# Patient Record
Sex: FEMALE | Race: ASIAN | NOT HISPANIC OR LATINO | Employment: FULL TIME
[De-identification: names, ages, dates, MRNs, and addresses within clinical notes are randomized per-mention and may not be internally consistent; named-entity substitution may affect disease eponyms.]

---

## 2018-11-21 ENCOUNTER — HEALTH MAINTENANCE LETTER (OUTPATIENT)
Age: 28
End: 2018-11-21

## 2018-11-29 ENCOUNTER — OFFICE VISIT (OUTPATIENT)
Dept: INTERNAL MEDICINE | Facility: CLINIC | Age: 28
End: 2018-11-29
Payer: COMMERCIAL

## 2018-11-29 ENCOUNTER — TELEPHONE (OUTPATIENT)
Dept: ENDOCRINOLOGY | Facility: CLINIC | Age: 28
End: 2018-11-29

## 2018-11-29 VITALS — OXYGEN SATURATION: 99 % | DIASTOLIC BLOOD PRESSURE: 70 MMHG | HEART RATE: 77 BPM | SYSTOLIC BLOOD PRESSURE: 106 MMHG

## 2018-11-29 DIAGNOSIS — D35.2 PITUITARY MICROADENOMA (H): ICD-10-CM

## 2018-11-29 DIAGNOSIS — D35.2 PITUITARY MICROADENOMA (H): Primary | ICD-10-CM

## 2018-11-29 DIAGNOSIS — N91.0 DELAYED MENSTRUATION: ICD-10-CM

## 2018-11-29 LAB
ALBUMIN SERPL-MCNC: 4.5 G/DL (ref 3.4–5)
ALP SERPL-CCNC: 72 U/L (ref 40–150)
ALT SERPL W P-5'-P-CCNC: 23 U/L (ref 0–50)
ANION GAP SERPL CALCULATED.3IONS-SCNC: 6 MMOL/L (ref 3–14)
AST SERPL W P-5'-P-CCNC: 14 U/L (ref 0–45)
BILIRUB SERPL-MCNC: 0.8 MG/DL (ref 0.2–1.3)
BUN SERPL-MCNC: 16 MG/DL (ref 7–30)
CALCIUM SERPL-MCNC: 9.3 MG/DL (ref 8.5–10.1)
CHLORIDE SERPL-SCNC: 104 MMOL/L (ref 94–109)
CO2 SERPL-SCNC: 27 MMOL/L (ref 20–32)
CREAT SERPL-MCNC: 0.59 MG/DL (ref 0.52–1.04)
ERYTHROCYTE [DISTWIDTH] IN BLOOD BY AUTOMATED COUNT: 12.2 % (ref 10–15)
ESTRADIOL SERPL-MCNC: 70 PG/ML
FSH SERPL-ACNC: 3.1 IU/L
GFR SERPL CREATININE-BSD FRML MDRD: >90 ML/MIN/1.7M2
GLUCOSE SERPL-MCNC: 82 MG/DL (ref 70–99)
HCG UR QL: NEGATIVE
HCT VFR BLD AUTO: 40.5 % (ref 35–47)
HGB BLD-MCNC: 13.8 G/DL (ref 11.7–15.7)
LH SERPL-ACNC: 11.1 IU/L
MCH RBC QN AUTO: 30.2 PG (ref 26.5–33)
MCHC RBC AUTO-ENTMCNC: 34.1 G/DL (ref 31.5–36.5)
MCV RBC AUTO: 89 FL (ref 78–100)
PLATELET # BLD AUTO: 184 10E9/L (ref 150–450)
POTASSIUM SERPL-SCNC: 3.6 MMOL/L (ref 3.4–5.3)
PROT SERPL-MCNC: 8.6 G/DL (ref 6.8–8.8)
RBC # BLD AUTO: 4.57 10E12/L (ref 3.8–5.2)
SODIUM SERPL-SCNC: 137 MMOL/L (ref 133–144)
T4 FREE SERPL-MCNC: 0.98 NG/DL (ref 0.76–1.46)
TSH SERPL DL<=0.005 MIU/L-ACNC: 1.93 MU/L (ref 0.4–4)
WBC # BLD AUTO: 3.5 10E9/L (ref 4–11)

## 2018-11-29 ASSESSMENT — PAIN SCALES - GENERAL: PAINLEVEL: NO PAIN (0)

## 2018-11-29 NOTE — PROGRESS NOTES
Internal Medicine Primary Care Clinic  -----------------------------------------------------------------  History of Present Illness   Makeda Perales is a 28 year old female with a history of pituitary microadenoma presenting with delayed menses.    Microadenoma of pituitary diagnosed 6 years ago in [place] (from [facility]). Formerly was at [facility] (I obtained records from [facility]). Took 4 years of bromocriptine. Went off bromocriptine for delivery of her baby and while breastfeeding, which she recently stopped.    Now she is presenting because her period is delayed. She had been having regular periods prior to this last period. Last period was Oct 11-15, and has not had a period since then. Sexually active, with one male partner, not using protection; neither trying for or avoiding pregnancy. No heat or cold intolerance. Normal appetite. Normal energy. Mood is not bad.    Electrical engineering post-doc. Has one daughter who is 17 months now.    Chest pain (position/pleuritic): no  Dyspnea: no   Abdominal pain: no  Weight change: no  Fever: no  Nausea/vomiting: no  Lightheaded/presyncope: no  Cough & product: no  Sore throat: no  Rhinorrhea: itchy nose  Headache: no  Myalgia: no  Dysuria/hematuria/discharge: no  Night sweats: no    -----------------------------------------------------------------  Assessment & Plan   Makeda Perales is a 28 year old female with a history of pituitary microadenoma presenting with delayed menses.    Delayed Menses  Could be due to pregnancy versus progression of microadenoma versus a broad differential. At this time we will first test for pregnancy as she has been sexually active. We will next help to establish her care here with endocrine while getting endocrine labs in preparation for that. Last prolactin 4/2018 was 18.8 in normal range but previously had been elevated in July 2017.  [ ] pregnancy test  [ ] prolactin  [ ] FSH  [ ] LH  [ ]  DHEA  [ ] Estradiol  [ ] Testosterone    Dio Brown MD (PGY-1)  [facility]  -----------------------------------------------------------------  Physical Exam   /70 (BP Location: Right arm, Patient Position: Sitting)  Pulse 77  LMP 10/11/2018 (Approximate)  SpO2 99%  Breastfeeding? No    General Appearance: no distress  HEENT: eyes equal and reactive to light  Respiratory: CTAB  Cardiovascular: RRR, no murmurs  GI: abdomen nontender, nondistended, BS+  Lymph/Hematologic: no LAD  Genitourinary: not examined  Skin: no rashes   Neurologic: grossly intact  Psychiatric: appropriate affect    -----------------------------------------------------------------  Additional Patient History  Review of Systems   The 10 point Review of Systems is negative other than noted in the HPI or here.    Past Medical History    No past medical history on file.    Past Surgical History   No past surgical history on file.    No surgeries.    Social History   Social History   Substance Use Topics     Smoking status: Never Smoker     Smokeless tobacco: Never Used     Alcohol use Not on file     , one child,   No smoking, no alcohol, no recreational drugs.    Family History    No family history on file.    No history of cancer.  No history of heart disease.  Parents still alive.  Younger brother is healthy.    Prior to Admission Medications     No current outpatient prescriptions on file prior to visit.  No current facility-administered medications on file prior to visit.     Took bromocriptine for 4 years. But would have stuffy nose sometimes.    Allergies    No Known Allergies    No allergies.    Data: Reviewed in Epic.  Pt was seen and examined with Dr. Brown.  I agree with his documentation as noted above.    My additional comments: None    J Carlos Kerr

## 2018-11-29 NOTE — NURSING NOTE
Chief Complaint   Patient presents with     Establish Care     patient is establishing care with dr patrizia Wilkerson at 9:02 AM on 11/29/2018.

## 2018-11-29 NOTE — PROGRESS NOTES
Internal Medicine Primary Care Clinic  -----------------------------------------------------------------  History of Present Illness   Makeda Perales is a 28 year old female with a history of     Period is delayed .    Last period was Oct 11-15, and has not had a period since then. Sexually active, with men, not using protection, one partner.    Microadenoma of pituitary diagnosed 6 years ago in [place] (from [facility]). Formerly was [facility] Took 4 years of bromocriptine.    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    Chest pain (position/pleuritic): no  Dyspnea: no   Abdominal pain: no  Weight change: no  Fever: no  Nausea/vomiting: no  Lightheaded/presyncope: no  Cough & product: no  Sore throat: no  Rhinorrhea: itchy nose  Headache: no  Myalgia: no  Dysuria/hematuria/discharge: no  Night sweats: no    NO heat or cold intolerance. Normal appetite. Normal energy. Mood is not bad.    Wt Readings from Last 3 Encounters:   No data found for Wt     Health maintenance:   -----------------------------------------------------------------  Assessment & Plan     [ ] prolactin  [ ] FSH/LH  DHEA  Estradiol  Testosterone      Dio Brown MD (PGY-1)  [facility]  -----------------------------------------------------------------  Physical Exam   /70 (BP Location: Right arm, Patient Position: Sitting)  Pulse 77  LMP 10/11/2018 (Approximate)  SpO2 99%  Breastfeeding? No    General Appearance:   HEENT: eyes equal and reactive to light  Respiratory: CTAB  Cardiovascular: RRR, no murmurs  GI: abdomen nontender, nondistended, BS+  Lymph/Hematologic: no LAD  Genitourinary: not examined  Skin: no rashes   Musculoskeletal:   Neurologic:   Psychiatric: appropriate affect    -----------------------------------------------------------------  Additional Patient History  Review of Systems   The 10 point Review of  Systems is negative other than noted in the HPI or here.    Past Medical History    No past medical history on file.    Past Surgical History   No past surgical history on file.    No surgeries.    Social History   Social History   Substance Use Topics     Smoking status: Never Smoker     Smokeless tobacco: Never Used     Alcohol use Not on file     , one child,   No smoking, no alcohol, no recreational drugs.    Family History    No family history on file.    No history of cancer.  No history of heart disease.  Parents still alive.  Younger brother is healthy.    Prior to Admission Medications     No current outpatient prescriptions on file prior to visit.  No current facility-administered medications on file prior to visit.     Took bromocriptine for 4 years. But would have stuffy nose sometimes.    Allergies    No Known Allergies    No allergies.    Data: Reviewed in Epic.

## 2018-11-29 NOTE — TELEPHONE ENCOUNTER
[facility] Call Center    Phone Message    May a detailed message be left on voicemail: yes    Reason for Call: Other: referral by Dr. Kerr for Pituitary microadenoma, please call pt to schedule     Action Taken: Message routed to:  [facility] Alvina

## 2018-11-29 NOTE — PATIENT INSTRUCTIONS
[facility] Medication Refill Request Information:  * Please contact your pharmacy regarding ANY request for medication refills.  ** [facility] Prescription Fax = [phone]  * Please allow 3 business days for routine medication refills.  * Please allow 5 business days for controlled substance medication refills.     [facility] Test Result notification information:  *You will be notified with in 7-10 days of your appointment day regarding the results of your test.  If you are on MyChart you will be notified as soon as the provider has reviewed the results and signed off on them.    [facility]: [phone]              [facility]         Internal Medicine Resident                   Continuity Clinic    Who We Are    Resident Continuity Clinic is a part of the [facility] Primary Care Clinic.  Resident physicians see patients independently and establish a relationship with them over the course of their three-year residency program.  As with the Primary Care Clinic, our Resident Continuity Clinic models a group practice.  If your doctor is not available, you will be able to see another resident physician.  At the end of a resident s training, patients will be transitioned to a new resident physician for ongoing care.     We treat patients with a wide array of medical needs from routine physicals, to acute illnesses, to diabetes and blood pressure management, to complex medical illness.  What is a Resident Physician?    Resident physicians hold medical degrees and are doctors. They are training to become specialists in Internal Medicine. They work under the supervision of board-certified faculty physicians.  Expectations for Your Care    We strive to provide accessible, quality care at all times.    In order to provide this care, it is best to see your primary care resident doctor consistently rather switching between providers.  In the event you do see another physician, you should  schedule a follow-up visit with your usual primary care doctor.    If you are transitioning your care from another clinic, it is helpful to have your records available for your doctor to review.    We do not prescribe controlled substances, such as ADD medications or narcotic pain medications, on your first visit.  We will review your health records and concerns prior to devising a treatment plan with you in order to provide the best care.      Clinic Services     Extended clinic hours; patient  to help navigate your visit;  parking; laboratory and imaging services with evening and weekend hours    Multiple medical and surgical specialties in one building    Complementary services, including Nutrition, Integrative Medicine, Pharmacy consultations, Mental and Behavioral Health, Sports Medicine and Physical Therapy    Thank You    We would like to thank you for choosing the [facility] Resident Continuity Clinic for your primary care. You are making a priceless contribution to the training of the next generation of health care practitioners.     Contact us at [phone] for appointments or questions.    Resident Clinic Hours are Tuesdays and Thursdays, 7:30am-5:00pm    Residents   Jerad Vega MD   (Male)   Deanna King MD  (Female)  Ena Bruno MD   (Female)   Tran Walker MD   (Female)   Obinna Lobo MD  (Male)   Angel Stover MD  (Male)   Chelsy Mancini MD    (Female)   Ellis Odell MD  (Male)   Monster Navarro MD  (Male)    Kathy Denney MD  (Female)   Charles Lovett MD  (Male)   Renetta Cota MD  (Female)   Chilango Harry MD    (Female)   Pablito Carroll MD  (Male)   Dio Brown MD  (Male)   Angel Parra MD (Male)   Gianluca Mejia MD  (Male)   Jennifer Camarena MD (Female)    Anali Haro MD (Female)   Deondre David MD  (Male)   Gabby Zhang MD    (Female)   Samina Romero MD  (Female)    Supervising Physicians   [place]  Yoselyn, MD Lili Woodson, MD Luis Enrique Sapp, MD J Carlos Kerr, MD Flores Chaudhry, MD Kandis De Luna, MD Dylan Yeager, MD Crys Vázquez, MD Dorinda Camilo MD

## 2018-11-29 NOTE — MR AVS SNAPSHOT
After Visit Summary   11/29/2018    Makeda Perales    MRN: [id number]           Patient Information     Date Of Birth          1990        Visit Information        Provider Department      11/29/2018 8:40 AM Dio Brown MD [facility]        Today's Diagnoses     Pituitary microadenoma (H)    -  1    Delayed menstruation          Care Instructions    Primary Care Center Medication Refill Request Information:  * Please contact your pharmacy regarding ANY request for medication refills.  ** [facility] Prescription Fax = [phone]  * Please allow 3 business days for routine medication refills.  * Please allow 5 business days for controlled substance medication refills.     Primary Care Center Test Result notification information:  *You will be notified with in 7-10 days of your appointment day regarding the results of your test.  If you are on MyChart you will be notified as soon as the provider has reviewed the results and signed off on them.    Primary Care Center: [phone]              [facility]         Internal Medicine Resident                   Continuity Clinic    Who We Are    Resident Continuity Clinic is a part of the [facility] Primary Care Clinic.  Resident physicians see patients independently and establish a relationship with them over the course of their three-year residency program.  As with the Primary Care Clinic, our Resident Continuity Clinic models a group practice.  If your doctor is not available, you will be able to see another resident physician.  At the end of a resident s training, patients will be transitioned to a new resident physician for ongoing care.     We treat patients with a wide array of medical needs from routine physicals, to acute illnesses, to diabetes and blood pressure management, to complex medical illness.  What is a Resident Physician?    Resident physicians hold medical degrees and are doctors. They are training to become  specialists in Internal Medicine. They work under the supervision of board-certified faculty physicians.  Expectations for Your Care    We strive to provide accessible, quality care at all times.    In order to provide this care, it is best to see your primary care resident doctor consistently rather switching between providers.  In the event you do see another physician, you should schedule a follow-up visit with your usual primary care doctor.    If you are transitioning your care from another clinic, it is helpful to have your records available for your doctor to review.    We do not prescribe controlled substances, such as ADD medications or narcotic pain medications, on your first visit.  We will review your health records and concerns prior to devising a treatment plan with you in order to provide the best care.      Clinic Services     Extended clinic hours; patient  to help navigate your visit;  parking; laboratory and imaging services with evening and weekend hours    Multiple medical and surgical specialties in one building    Complementary services, including Nutrition, Integrative Medicine, Pharmacy consultations, Mental and Behavioral Health, Sports Medicine and Physical Therapy    Thank You    We would like to thank you for choosing the [facility] Internal Medicine Resident Continuity Clinic for your primary care. You are making a priceless contribution to the training of the next generation of health care practitioners.     Contact us at [phone] for appointments or questions.    Resident Clinic Hours are Tuesdays and Thursdays, 7:30am-5:00pm    Residents   Jerad Vega MD   (Male)   Deanna King MD  (Female)  Ena Bruno MD   (Female)   Tran Walker MD   (Female)   Obinna Lobo MD  (Male)   Angel Stover MD  (Male)   Chelsy Mancini MD    (Female)   Ellis Odell MD  (Male)   Monster Navarro MD  (Male)    Kathy Denney MD  (Female)   Charles  MD Bony  (Male)   Renetta Cota MD  (Female)   Chilango Harry MD    (Female)   Pablito Carroll MD  (Male)   Dio Brown MD  (Male)   Angel Parra MD (Male)   Gianluca Mejia MD  (Male)   Jennifer Camarena MD (Female)    Anali Haro MD (Female)   Deondre David MD  (Male)   Gabby Zhang MD    (Female)   Samina Romero MD  (Female)    Supervising Physicians   MD Lili De Los Santos, MD Luis Enrique Sapp, MD J Carlos Kerr, MD Flores Chaudhry, MD Kandis De Luna, MD Dylan Yeager, MD Crys Vázquez, MD Dorinda Camilo MD                    Follow-ups after your visit        Additional Services     ENDOCRINOLOGY ADULT REFERRAL       Your provider has referred you to: nearest location available for her (she lives at [facility])    Please be aware that coverage of these services is subject to the terms and limitations of your health insurance plan.  Call member services at your health plan with any benefit or coverage questions.      Please bring the following to your appointment:    >>   Any x-rays, CTs or MRIs which have been performed.  Contact the facility where they were done to arrange for  prior to your scheduled appointment.    >>   List of current medications   >>   This referral request   >>   Any documents/labs given to you for this referral                  Future tests that were ordered for you today     Open Future Orders        Priority Expected Expires Ordered    TSH Routine 11/29/2018 11/29/2019 11/29/2018    Follicle stimulating hormone Routine 11/29/2018 11/29/2019 11/29/2018    Lutropin Routine 11/29/2018 11/29/2019 11/29/2018    Dehydroepiandrosterone Routine  11/30/2019 11/29/2018    Estradiol Routine 11/29/2018 11/29/2019 11/29/2018    Testosterone Free and Total Routine 11/29/2018 11/29/2019 11/29/2018    T4 free Routine 11/29/2018 11/29/2019 11/29/2018    Comprehensive metabolic panel Routine 11/29/2018 12/13/2018 11/29/2018    CBC with  platelets Routine 2018            Who to contact     Please call your clinic at [phone] to:    Ask questions about your health    Make or cancel appointments    Discuss your medicines    Learn about your test results    Speak to your doctor            Additional Information About Your Visit        MyChart Information     [employer] is an electronic gateway that provides easy, online access to your medical records. With [employer], you can request a clinic appointment, read your test results, renew a prescription or communicate with your care team.     To sign up for [employer] visit the website at [email or web address]   You will be asked to enter the access code listed below, as well as some personal information. Please follow the directions to create your username and password.     Your access code is: [email or web address]  Expires: 2019 10:51 AM     Your access code will  in 90 days. If you need help or a new code, please contact your [facility] or call [phone] for assistance.        Care EveryWhere ID     This is your Care EveryWhere ID. This could be used by other organizations to access your [place] medical records  [id number]        Your Vitals Were     Pulse Last Period Pulse Oximetry Breastfeeding?          77 10/11/2018 (Approximate) 99% No         Blood Pressure from Last 3 Encounters:   18 106/70    Weight from Last 3 Encounters:   No data found for Wt              We Performed the Following     ENDOCRINOLOGY ADULT REFERRAL     HCG Qual, Urine - [facility],  [place]  ([id number])        Primary Care Provider Office Phone # Fax #    Dio Neville Brown [phone]       [address] [place] [address]        Equal Access to Services     LEANA RANDLE : Jonna Merchant, maggi saldaña, violet snyder. So [facility]  [phone].    ATENCIÓN: Si habla stephany, tiene a cervantes disposición servicios gratuitos de asistencia lingüística. Llleatha al [phone].    We comply with applicable federal civil rights laws and [place] laws. We do not discriminate on the basis of race, color, national origin, age, disability, sex, sexual orientation, or gender identity.            Thank you!     Thank you for choosing [facility] PRIMARY CARE CLINIC  for your care. Our goal is always to provide you with excellent care. Hearing back from our patients is one way we can continue to improve our services. Please take a few minutes to complete the written survey that you may receive in the mail after your visit with us. Thank you!             Your Updated Medication List - Protect others around you: Learn how to safely use, store and throw away your medicines at www.disposemymeds.org.      Notice  As of 11/29/2018  9:49 AM    You have not been prescribed any medications.

## 2018-11-29 NOTE — TELEPHONE ENCOUNTER
To schedulers: Please schedule  for lst available endocrine. Preferred provider [facility].    Sofia Mark MD  Endocrine triage

## 2018-11-30 LAB
SHBG SERPL-SCNC: 92 NMOL/L (ref 30–135)
TESTOST FREE SERPL-MCNC: 0.3 NG/DL (ref 0.08–0.74)
TESTOST SERPL-MCNC: 37 NG/DL (ref 8–60)

## 2018-12-02 LAB — DHEA SERPL-MCNC: 3.34 NG/ML (ref 1.33–7.78)

## 2018-12-17 ENCOUNTER — OFFICE VISIT (OUTPATIENT)
Dept: ENDOCRINOLOGY | Facility: CLINIC | Age: 28
End: 2018-12-17
Payer: COMMERCIAL

## 2018-12-17 VITALS — WEIGHT: 111.7 LBS | SYSTOLIC BLOOD PRESSURE: 100 MMHG | HEART RATE: 77 BPM | DIASTOLIC BLOOD PRESSURE: 67 MMHG

## 2018-12-17 DIAGNOSIS — D35.2 PROLACTINOMA (H): Primary | ICD-10-CM

## 2018-12-17 DIAGNOSIS — D35.2 PROLACTINOMA (H): ICD-10-CM

## 2018-12-17 DIAGNOSIS — N64.3 GALACTORRHEA: ICD-10-CM

## 2018-12-17 DIAGNOSIS — N92.6 IRREGULAR MENSES: ICD-10-CM

## 2018-12-17 DIAGNOSIS — D49.7 PITUITARY TUMOR: ICD-10-CM

## 2018-12-17 LAB — PROLACTIN SERPL-MCNC: 51 UG/L (ref 3–27)

## 2018-12-17 ASSESSMENT — PAIN SCALES - GENERAL: PAINLEVEL: NO PAIN (0)

## 2018-12-17 NOTE — PROGRESS NOTES
Medical student's note followed by attending note    ----------  Medical Student note -------------    Makeda Angella is a 28 year old female with past medical history of Prolactinoma, diagnosed in 2012 in [place], managed with Bromocriptine 2.5mg BID for 4 years. MRI done is [place] showed pituitary mass <1cm, according to patient. Last MRI done in 10/2014 in [facility] ([facility]) showed no discrete pituitary lesion.    She became pregnant in end of 2016 and Bromocriptine was stopped. She delivered a female baby in June 2017 and was breastfeeding until April 2018. Bromocriptine was not resumed, as prolactin levels were normal.    Her primary concern is delayed periods, last cycle was 2 months. LMP 12/10/2018  She reports mild galactorrhea even now.    She is stressed due to work recently  Her energy level has been good.   No headaches, no visual disturbances, no dizziness.   She denies nausea, no change in weight; apetite good, sleeping well; no heat/cold intolerance      Past Medical History   Past Medical History:   Diagnosis Date     Prolactinoma (H)        Past Surgical History   History reviewed. No pertinent surgical history.    Current Medications  Nil      History reviewed. No pertinent family history.    Social History  She denies smoking, drinking alcohol or using illicit drugs.   Occupation: Post doctorate in electrical engineering at the [employer]  She shifted to [place] in 2013, did PhD at [facility] and shifted to [place] in August 2018    Social History     Socioeconomic History     Marital status:      Spouse name: None     Number of children: One     Years of education: None     Highest education level: None   Social Needs     Financial resource strain: None     Food insecurity - worry: None     Food insecurity - inability: None     Transportation needs - medical: None     Transportation needs - non-medical: None   Occupational History     None   Tobacco Use     Smoking status: Never Smoker     Smokeless  tobacco: Never Used   Substance and Sexual Activity     Alcohol use: None     Drug use: None     Sexual activity: None   Other Topics Concern     None   Social History Narrative     None       Review of Systems   Systemic:  Eye:                      No eye symptoms   Ethan-Laryngeal:     No ethan-laryngeal symptoms, no dysphagia, no hoarseness, no cough     Breast:                 Galactorrhea present  Cardiovascular:    No cardiovascular symptoms, no CP or palpitations   Pulmonary:           No pulmonary symptoms, no SOB or cough    Gastrointestinal:   No gastrointestinal symptoms, no diarrhea or constipation   Genitourinary:       No genitourinary symptoms, no increased thirst or urination   Endocrine:            No endocrine symptoms, no cold or heat intolerance   Neurological:        No neurological symptoms, no headaches, no tremor, no numbness or tingling sensation, no dizziness   Musculoskeletal:  No musculoskeletal symptoms, no muscle or joint pain   Skin:                     No skin symptoms, no dry skin, no hair falling out   Psychological:     No psychological symptoms                 Vital Signs     Previous Weights:    Wt Readings from Last 10 Encounters:   12/17/18 50.7 kg (111 lb 11.2 oz)        /67   Pulse 77   Wt 50.7 kg (111 lb 11.2 oz)     Physical Exam  General Appearance: she is well developed, well nourished and in no distress     Eyes:  extra-ocular motions are normal on confrontation test                                    pupils round and reactive to light, no lid lag, no stare    HEENT:   oropharynx clear and moist, no JVD, no bruits      no thyromegaly, no palpable nodules   Breast:                      Mild galactorrhea present  Cardiovascular:  regular rhythm, no murmurs, distal pulse palpable, no edema  Respiratory:        chest clear, no rales, no rhonchi   Gastrointestinal:  abdomen soft, non-tender, non-distended, normal bowel sounds,    no organomegaly  Musculoskeletal:  normal  tone and strength  Psychological:          affect and judgment normal  Skin:  warm, no lesions  Neurological:  reflexes normal and symmetric, no resting tremor.        Lab Results  I reviewed prior lab results documented in Epic.    Prolactin  2/19/2018 - 29.2  4/28/2018 - 18.8    ENDO PITUITARY LABS-[facility] Latest Ref Rng & Units 11/29/2018   TSH 0.40 - 4.00 mU/L 1.93   FSH IU/L 3.1   LUTROPIN IU/L 11.1   ESTRADIOL pg/mL 70   TESTOSTERONE TOTAL 8 - 60 ng/dL 37    - 144 mmol/L 137   POTASSIUM 3.4 - 5.3 mmol/L 3.6   GLUCOSE 70 - 99 mg/dL 82         Assessment and Plan    Pituitary Microadenoma  Patient has history of pituitary microadenoma since 2012, previously managed by Bromocriptine 2.5mg BID, which was stopped when she became pregnant. MRI done in 10/2014 showed no discrete pituitary lesion, probably due to tumor shrinkage after Bromocriptine. She had an uneventful pregnancy and delivered a daughter in June 2017. Bromocriptine was not resumed, as prolactin levels were normal.      Currently, she is concerned regarding delayed periods and continued galactorrhea. This could be influenced by her accompanied stress. We will recheck prolactin levels today. If prolactin is high, we will consider Cabergoline once a week dosing. MRI Pituitary not indicated at this point.    Recommendation  - Prolactin level today  - IGF-1 today                                                                                --- Endocrinology Initial Consultation Attending note----    Reason for visit/consult:  Prolactinoma (H)    Primary care provider: Dio Brown    HPI: A 27 yo female with history of pituitary microadenoma since 2012, previously managed by Bromocriptine 2.5mg BID, which was stopped when she became pregnant. MRI done in 10/2014 showed no discrete pituitary lesion, probably due to tumor shrinkage after Bromocriptine. She had an uneventful pregnancy and delivered a daughter in June 2017. Bromocriptine was not  resumed, as prolactin levels were normal,    Past Medical/Surgical History:  Past Medical History:   Diagnosis Date     Prolactinoma (H)      History reviewed. No pertinent surgical history.    Allergies:  No Known Allergies    Current Medications   No current outpatient medications on file.     No current facility-administered medications for this visit.        Family History:  History reviewed. No pertinent family history.    Social History:  Social History     Tobacco Use     Smoking status: Never Smoker     Smokeless tobacco: Never Used   Substance Use Topics     Alcohol use: Not on file       ROS:  Full review of systems taken with the help of the intake sheet. Otherwise a complete 14 point review of systems was taken and is negative unless stated in the history above.      Physical Exam:   /67   Pulse 77   Wt 50.7 kg (111 lb 11.2 oz)   General: well appearing, no acute distress, pleasant and conversant,   Mental Status/neuro: alert and oriented  Face: symmetrical, normal facial color  Eyes: anicteric, PERRL, no proptosis or lid lag  Visual field: intact  Occular motor: full  Neck: suppler, no lymphadenopahty  Thyroid: normal size and texture, no nodule palpable, no bruits  Breast: mild galactorrhea bilateral  Heart: regular rhythm, S1S2, no murmur appreciated  Lung: clear to auscultation bilaterally  Abdomen: soft, NT/ND, no hepatomegaly  Legs: no swelling or edema      Labs : I reviewed data from epic and extract and summarize the pertinent data here.        12/17/2018 09:48   Prolactin 51 (H)     Lab Results   Component Value Date     11/29/2018      Lab Results   Component Value Date    POTASSIUM 3.6 11/29/2018     Lab Results   Component Value Date    CHLORIDE 104 11/29/2018     Lab Results   Component Value Date    BECKY 9.3 11/29/2018     Lab Results   Component Value Date    CO2 27 11/29/2018     Lab Results   Component Value Date    BUN 16 11/29/2018     Lab Results   Component Value Date     CR 0.59 11/29/2018     Lab Results   Component Value Date    GLC 82 11/29/2018     Lab Results   Component Value Date    TSH 1.93 11/29/2018     Lab Results   Component Value Date    T4 0.98 11/29/2018     Lab Results   Component Value Date    LH 11.1 11/29/2018     Lab Results   Component Value Date    FSH 3.1 11/29/2018     Lab Results   Component Value Date    ESTROGEN 70 11/29/2018     Lab Results   Component Value Date    PROLACTIN 51 12/17/2018         MRI Brain: I extracted from care everywhere original images not available.   Interface, Radiology Results Conversion - 04/19/2016  5:07 PM EDT  Final Report  CLINICAL INDICATIONS:   h/o microadenoma routine surveillance  MRI 3072 - PIT W/WO C  - Oct 23 2014  8:34AM   Acc#: [id number]  RESULT: MRI of the brain  Indication: Reported history pituitary microadenoma. Surveillance imaging.  Technique: Multiplanar, multisequential imaging was obtained without and with  intravenous administration of contrast.  Comparison: No prior available studies.  Findings:  There is no abnormal differential enhancement within the pituitary gland. The  infundibulum is slightly deviated to the right of midline. The carotid  flow-voids are maintained. The cavernous sinuses opacify symmetrically. No  suprasellar soft tissue lesion is seen.  No diffusion abnormalities are identified to suggest acute or subacute  infarct. The ventricles are normal size. No mass effect or midline shift is  seen. No extra-axial fluid collections are noted. No brain parenchymal signal  abnormalities are seen. The cerebellum and brainstem are normal. There is no  pathologicmagnetic susceptibility artifact on the gradient refocused  acquisition.  No abnormal parenchymal or leptomeningeal enhancement is seen. The orbits are  grossly unremarkable. The craniovertebral junction and marrow signal are  normal. The major intracranial flow-voids of the level of the [place] are preserved. The mastoid air  cells and paranasal sinuses are  well-aerated.  IMPRESSION:  No discrete pituitary lesion visible.  No acute intracranial pathology.  Interpreting Physician:  SERAFIN JORDAN M.D.         Assessment and Plan  28 year old female with history of micro prolactinoma    # prolactinoma  Patient was diagnosed prolactinoma in [place] with microadenoma dictating MRI, it seems responded to by bromocriptine, by the time she came to [facility] MRI showed no sign of tumor possibly 2 more shrunk the bromocriptine.    - PRL, IGF1      # Persistent galactorrhea  Most recent prolactin level was normal, but her menstrual cycles also delaying, we will repeat PRL.  - PRL   - will determine DA based on the results.     Addendum  Contacted patient to start cabergoline 0.25 mg once a week. Recheck level in 3 month, Also order MRI.    Ref. Range 12/17/2018 09:48   Prolactin Latest Ref Range: 3 - 27 ug/L 51 (H)       I spent 45 minutes with this patient face to face and explained the conditions and plans (more than 50% of time was counseling/coordination of care) . The patient understood and is satisfied with today's visit. Return to clinic with me in 8 months.         Colleen Rodríguez MD  Staff Physician  Endocrinology and Metabolism  License: [id number]

## 2018-12-17 NOTE — LETTER
12/17/2018     RE: Makeda Perales  [address]  [place] [address]     Dear Colleague,    Thank you for referring your patient, Makeda Perales, to the [facility] ENDOCRINOLOGY at [facility]. Please see a copy of my visit note below.    Medical student's note followed by attending note    ----------  Medical Student note -------------    Makeda Perales is a 28 year old female with past medical history of Prolactinoma, diagnosed in 2012 in [place], managed with Bromocriptine 2.5mg BID for 4 years. MRI done is [place] showed pituitary mass <1cm, according to patient. Last MRI done in 10/2014 in [facility] showed no discrete pituitary lesion.    She became pregnant in end of 2016 and Bromocriptine was stopped. She delivered a female baby in June 2017 and was breastfeeding until April 2018. Bromocriptine was not resumed, as prolactin levels were normal.    Her primary concern is delayed periods, last cycle was 2 months. LMP 12/10/2018  She reports mild galactorrhea even now.    She is stressed due to work recently  Her energy level has been good.   No headaches, no visual disturbances, no dizziness.   She denies nausea, no change in weight; apetite good, sleeping well; no heat/cold intolerance      Past Medical History   Past Medical History:   Diagnosis Date     Prolactinoma (H)        Past Surgical History   History reviewed. No pertinent surgical history.    Current Medications  Nil      History reviewed. No pertinent family history.    Social History  She denies smoking, drinking alcohol or using illicit drugs.   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[employer]   She shifted to [place] in [place], did PhD at [employer] and shifted to [place] in August 2018    Social History     Socioeconomic History     Marital status:      Spouse name: None     Number of children: One     Years of education: None     Highest education level: None   Social Needs     Financial resource strain:  None     Food insecurity - worry: None     Food insecurity - inability: None     Transportation needs - medical: None     Transportation needs - non-medical: None   Occupational History     None   Tobacco Use     Smoking status: Never Smoker     Smokeless tobacco: Never Used   Substance and Sexual Activity     Alcohol use: None     Drug use: None     Sexual activity: None   Other Topics Concern     None   Social History Narrative     None       Review of Systems   Systemic:  Eye:                      No eye symptoms   Ethan-Laryngeal:     No ethan-laryngeal symptoms, no dysphagia, no hoarseness, no cough     Breast:                 Galactorrhea present  Cardiovascular:    No cardiovascular symptoms, no CP or palpitations   Pulmonary:           No pulmonary symptoms, no SOB or cough    Gastrointestinal:   No gastrointestinal symptoms, no diarrhea or constipation   Genitourinary:       No genitourinary symptoms, no increased thirst or urination   Endocrine:            No endocrine symptoms, no cold or heat intolerance   Neurological:        No neurological symptoms, no headaches, no tremor, no numbness or tingling sensation, no dizziness   Musculoskeletal:  No musculoskeletal symptoms, no muscle or joint pain   Skin:                     No skin symptoms, no dry skin, no hair falling out   Psychological:     No psychological symptoms                 Vital Signs     Previous Weights:    Wt Readings from Last 10 Encounters:   12/17/18 50.7 kg (111 lb 11.2 oz)        /67   Pulse 77   Wt 50.7 kg (111 lb 11.2 oz)     Physical Exam  General Appearance: she is well developed, well nourished and in no distress     Eyes:  extra-ocular motions are normal on confrontation test                                    pupils round and reactive to light, no lid lag, no stare    HEENT:   oropharynx clear and moist, no JVD, no bruits      no thyromegaly, no palpable nodules   Breast:                      Mild galactorrhea  present  Cardiovascular:  regular rhythm, no murmurs, distal pulse palpable, no edema  Respiratory:        chest clear, no rales, no rhonchi   Gastrointestinal:  abdomen soft, non-tender, non-distended, normal bowel sounds,    no organomegaly  Musculoskeletal:  normal tone and strength  Psychological:          affect and judgment normal  Skin:  warm, no lesions  Neurological:  reflexes normal and symmetric, no resting tremor.        Lab Results  I reviewed prior lab results documented in Epic.    Prolactin  2/19/2018 - 29.2  4/28/2018 - 18.8    ENDO PITUITARY LABS-[facility] Latest Ref Rng & Units 11/29/2018   TSH 0.40 - 4.00 mU/L 1.93   FSH IU/L 3.1   LUTROPIN IU/L 11.1   ESTRADIOL pg/mL 70   TESTOSTERONE TOTAL 8 - 60 ng/dL 37    - 144 mmol/L 137   POTASSIUM 3.4 - 5.3 mmol/L 3.6   GLUCOSE 70 - 99 mg/dL 82     Assessment and Plan    Pituitary Microadenoma  Patient has history of pituitary microadenoma since 2012, previously managed by Bromocriptine 2.5mg BID, which was stopped when she became pregnant. MRI done in 10/2014 showed no discrete pituitary lesion, probably due to tumor shrinkage after Bromocriptine. She had an uneventful pregnancy and delivered a daughter in June 2017. Bromocriptine was not resumed, as prolactin levels were normal.    Currently, she is concerned regarding delayed periods and continued galactorrhea. This could be influenced by her accompanied stress. We will recheck prolactin levels today. If prolactin is high, we will consider Cabergoline once a week dosing. MRI Pituitary not indicated at this point.    Recommendation  - Prolactin level today  - IGF-1 today                                                     --- Endocrinology Initial Consultation Attending note----    Reason for visit/consult:  Prolactinoma (H)    Primary care provider: Dio Brown    HPI: A 27 yo female with history of pituitary microadenoma since 2012, previously managed by Bromocriptine 2.5mg BID, which was  stopped when she became pregnant. MRI done in 10/2014 showed no discrete pituitary lesion, probably due to tumor shrinkage after Bromocriptine. She had an uneventful pregnancy and delivered a daughter in June 2017. Bromocriptine was not resumed, as prolactin levels were normal,    Past Medical/Surgical History:  Past Medical History:   Diagnosis Date     Prolactinoma (H)      History reviewed. No pertinent surgical history.    Allergies:  No Known Allergies    Current Medications   No current outpatient medications on file.     No current facility-administered medications for this visit.      Family History:  History reviewed. No pertinent family history.    Social History:  Social History     Tobacco Use     Smoking status: Never Smoker     Smokeless tobacco: Never Used   Substance Use Topics     Alcohol use: Not on file       ROS:  Full review of systems taken with the help of the intake sheet. Otherwise a complete 14 point review of systems was taken and is negative unless stated in the history above.      Physical Exam:   /67   Pulse 77   Wt 50.7 kg (111 lb 11.2 oz)   General: well appearing, no acute distress, pleasant and conversant,   Mental Status/neuro: alert and oriented  Face: symmetrical, normal facial color  Eyes: anicteric, PERRL, no proptosis or lid lag  Visual field: intact  Occular motor: full  Neck: suppler, no lymphadenopahty  Thyroid: normal size and texture, no nodule palpable, no bruits  Breast: mild galactorrhea bilateral  Heart: regular rhythm, S1S2, no murmur appreciated  Lung: clear to auscultation bilaterally  Abdomen: soft, NT/ND, no hepatomegaly  Legs: no swelling or edema      Labs : I reviewed data from epic and extract and summarize the pertinent data here.        12/17/2018 09:48   Prolactin 51 (H)     Lab Results   Component Value Date     11/29/2018      Lab Results   Component Value Date    POTASSIUM 3.6 11/29/2018     Lab Results   Component Value Date    CHLORIDE  104 11/29/2018     Lab Results   Component Value Date    BECKY 9.3 11/29/2018     Lab Results   Component Value Date    CO2 27 11/29/2018     Lab Results   Component Value Date    BUN 16 11/29/2018     Lab Results   Component Value Date    CR 0.59 11/29/2018     Lab Results   Component Value Date    GLC 82 11/29/2018     Lab Results   Component Value Date    TSH 1.93 11/29/2018     Lab Results   Component Value Date    T4 0.98 11/29/2018     Lab Results   Component Value Date    LH 11.1 11/29/2018     Lab Results   Component Value Date    FSH 3.1 11/29/2018     Lab Results   Component Value Date    ESTROGEN 70 11/29/2018     Lab Results   Component Value Date    PROLACTIN 51 12/17/2018         MRI Brain: I extracted from care everywhere original images not available.   Interface, Radiology Results Conversion - 04/19/2016  5:07 PM EDT  Final Report  CLINICAL INDICATIONS:   h/o microadenoma routine surveillance  MRI 3072 - PIT W/WO C  - Oct 23 2014  8:34AM   Acc#: [id number]  RESULT: MRI of the brain  Indication: Reported history pituitary microadenoma. Surveillance imaging.  Technique: Multiplanar, multisequential imaging was obtained without and with  intravenous administration of contrast.  Comparison: No prior available studies.  Findings:  There is no abnormal differential enhancement within the pituitary gland. The  infundibulum is slightly deviated to the right of midline. The carotid  flow-voids are maintained. The cavernous sinuses opacify symmetrically. No  suprasellar soft tissue lesion is seen.  No diffusion abnormalities are identified to suggest acute or subacute  infarct. The ventricles are normal size. No mass effect or midline shift is  seen. No extra-axial fluid collections are noted. No brain parenchymal signal  abnormalities are seen. The cerebellum and brainstem are normal. There is no  pathologicmagnetic susceptibility artifact on the gradient refocused  acquisition.  No abnormal parenchymal or  leptomeningeal enhancement is seen. The orbits are  grossly unremarkable. The craniovertebral junction and marrow signal are  normal. The major intracranial flow-voids of the level of the [place] are preserved. The mastoid air cells and paranasal sinuses are  well-aerated.  IMPRESSION:  No discrete pituitary lesion visible.  No acute intracranial pathology.  Interpreting Physician:  SERAFIN JORDAN M.D.         Assessment and Plan  28 year old female with history of micro prolactinoma    # prolactinoma  Patient was diagnosed prolactinoma in [place] with microadenoma dictating MRI, it seems responded to by bromocriptine, by the time she came to [facility] MRI showed no sign of tumor possibly 2 more shrunk the bromocriptine.    - PRL, IGF1      # Persistent galactorrhea  Most recent prolactin level was normal, but her menstrual cycles also delaying, we will repeat PRL.  - PRL   - will determine DA based on the results.     Addendum  Contacted patient to start cabergoline 0.25 mg once a week. Recheck level in 3 month, Also order MRI.    Ref. Range 12/17/2018 09:48   Prolactin Latest Ref Range: 3 - 27 ug/L 51 (H)       I spent 45 minutes with this patient face to face and explained the conditions and plans (more than 50% of time was counseling/coordination of care) . The patient understood and is satisfied with today's visit. Return to clinic with me in 8 months.         Colleen Rodríguez MD  Staff Physician  Endocrinology and Metabolism  License: [id number]

## 2018-12-18 LAB — IGF-I BLD-MCNC: 226 NG/ML (ref 96–301)

## 2018-12-19 RX ORDER — CABERGOLINE 0.5 MG/1
0.25 TABLET ORAL WEEKLY
Qty: 6 TABLET | Refills: 3 | Status: SHIPPED | OUTPATIENT
Start: 2018-12-19 | End: 2019-04-05

## 2019-01-05 ENCOUNTER — ANCILLARY PROCEDURE (OUTPATIENT)
Dept: MRI IMAGING | Facility: CLINIC | Age: 29
End: 2019-01-05
Attending: INTERNAL MEDICINE
Payer: COMMERCIAL

## 2019-01-05 DIAGNOSIS — D35.2 PROLACTINOMA (H): ICD-10-CM

## 2019-01-05 RX ORDER — GADOBUTROL 604.72 MG/ML
7.5 INJECTION INTRAVENOUS ONCE
Status: COMPLETED | OUTPATIENT
Start: 2019-01-05 | End: 2019-01-05

## 2019-01-05 RX ADMIN — GADOBUTROL 6 ML: 604.72 INJECTION INTRAVENOUS at 09:51

## 2019-01-22 ENCOUNTER — TELEPHONE (OUTPATIENT)
Dept: ENDOCRINOLOGY | Facility: CLINIC | Age: 29
End: 2019-01-22

## 2019-01-23 NOTE — TELEPHONE ENCOUNTER
"----- Message from Colleen Rodríguez MD sent at 1/22/2019  9:56 AM CST -----  Regarding: RE: pt has questions  cabergoline 0.25 mg once a week, prescribed immediate after previous visit. Tell her this is the smallest dose.     Colleen  ----- Message -----  From: Damari Jane RN  Sent: 1/18/2019   2:29 PM  To: Colleen Rodríguez MD  Subject: pt has questions                                 She questions the  \"small possible  Tumor\" Clarify what dose of cabergoline she should be on ? She doesn't know .     "

## 2019-04-05 ENCOUNTER — MYC REFILL (OUTPATIENT)
Dept: ENDOCRINOLOGY | Facility: CLINIC | Age: 29
End: 2019-04-05

## 2019-04-05 DIAGNOSIS — D35.2 PROLACTINOMA (H): ICD-10-CM

## 2019-04-05 RX ORDER — CABERGOLINE 0.5 MG/1
0.25 TABLET ORAL WEEKLY
Qty: 6 TABLET | Refills: 3 | Status: SHIPPED | OUTPATIENT
Start: 2019-04-05 | End: 2019-08-16

## 2019-05-28 DIAGNOSIS — D35.2 PROLACTINOMA (H): ICD-10-CM

## 2019-05-28 LAB — PROLACTIN SERPL-MCNC: 5 UG/L (ref 3–27)

## 2019-07-24 ENCOUNTER — DOCUMENTATION ONLY (OUTPATIENT)
Dept: CARE COORDINATION | Facility: CLINIC | Age: 29
End: 2019-07-24

## 2019-08-06 ENCOUNTER — OFFICE VISIT (OUTPATIENT)
Dept: ENDOCRINOLOGY | Facility: CLINIC | Age: 29
End: 2019-08-06
Payer: COMMERCIAL

## 2019-08-06 VITALS — HEART RATE: 83 BPM | WEIGHT: 112.5 LBS | SYSTOLIC BLOOD PRESSURE: 96 MMHG | DIASTOLIC BLOOD PRESSURE: 65 MMHG

## 2019-08-06 DIAGNOSIS — D35.2 PROLACTINOMA (H): Primary | ICD-10-CM

## 2019-08-06 DIAGNOSIS — D49.7 PITUITARY TUMOR: ICD-10-CM

## 2019-08-06 DIAGNOSIS — N92.6 IRREGULAR MENSTRUAL CYCLE: ICD-10-CM

## 2019-08-06 DIAGNOSIS — N64.3 GALACTORRHEA: ICD-10-CM

## 2019-08-06 ASSESSMENT — PAIN SCALES - GENERAL: PAINLEVEL: NO PAIN (0)

## 2019-08-06 NOTE — PROGRESS NOTES
- Endocrinology Follow up -    Reason for visit/consult: elevated PRL, galactorrhea, pituitary tumor    Primary care provider: Dio Brown        Assessment and Plan  28 year old female with history of micro prolactinoma    # prolactinoma  Patient was diagnosed prolactinoma in [place] with microadenoma dictating MRI, it seems responded to by bromocriptine, by the time she came to [facility] MRI showed no sign of tumor possibly shrunk the bromocriptine.   However repeated the prolactin level upon the first visit was 51, with possible microadenoma on the right intrasellar, therefore we started cabergoline 0.25 mg weekly patient is compliant and tolerant to medication.  Her prolactin level dropped from 51 down to 5 in May 2019.  Also patient started to have regular menstrual cycle for the past few months.    -Continue current cabergoline 0.25 mg weekly  -Next MRI in January 2019  - PRL to check in January 2019 prior to next visit      # Persistent galactorrhea  Due to elevated PRL, now PRL normalized and galactorrhea resolved.    -Continue current cabergoline 0.25 mg weekly  - PRL to check in January 2019 prior to next visit    # irregular cycles  Resolved with normalized PRL    -Continue current cabergoline 0.25 mg weekly      I spent 30 minutes with this patient face to face and explained the conditions and plans (more than 50% of time was counseling/coordination of care) . The patient understood and is satisfied with today's visit. Return to clinic with me in 6 month.         Colleen Rodríguez MD  Staff Physician  Endocrinology and Metabolism  License: [id number]      Interval History as of 8/6/2019: Patient has been doing well. Last seen in 12/2018 . Medication compliance excellent  . New event includes galactorrhea and irregular menses resolved .    HPI: A 27 yo female with history of pituitary microadenoma since 2012, previously managed  by Bromocriptine 2.5mg BID, which was stopped when she became pregnant. MRI done in 10/2014 showed no discrete pituitary lesion, probably due to tumor shrinkage after Bromocriptine. She had an uneventful pregnancy and delivered a daughter in June 2017. Bromocriptine was not resumed, as prolactin levels were normal,    She became pregnant in end of 2016 and Bromocriptine was stopped. She delivered a female baby in June 2017 and was breastfeeding until April 2018. Bromocriptine was not resumed, as prolactin levels were normal.    Her primary concern is delayed periods, last cycle was 2 months. LMP 12/10/2018  She reports mild galactorrhea even now.    She is stressed due to work recently  Her energy level has been good.   No headaches, no visual disturbances, no dizziness.   She denies nausea, no change in weight; apetite good, sleeping well; no heat/cold intolerance        Past Medical/Surgical History:  Past Medical History:   Diagnosis Date     Prolactinoma (H)      History reviewed. No pertinent surgical history.    Allergies:  No Known Allergies    Current Medications   Current Outpatient Medications   Medication     cabergoline (DOSTINEX) 0.5 MG tablet     No current facility-administered medications for this visit.        Family History:  History reviewed. No pertinent family history.    Social History:  Social History     Tobacco Use     Smoking status: Never Smoker     Smokeless tobacco: Never Used   Substance Use Topics     Alcohol use: Not on file   She denies smoking, drinking alcohol or using illicit drugs.   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 Otherwise a complete 14 point review of systems was taken and is negative unless stated in the history above.      Physical Exam:   BP 96/65   Pulse 83   Wt 51 kg (112 lb 8  oz)      General: well appearing, no acute distress, pleasant and conversant,   Mental Status/neuro: alert and oriented  Face: symmetrical, normal facial color  Eyes: anicteric, PERRL, no proptosis or lid lag  Visual field: intact  Occular motor: full  Neck: suppler, no lymphadenopahty  Thyroid: normal size and texture, no nodule palpable, no bruits  Breast: mild galactorrhea bilateral  Heart: regular rhythm, S1S2, no murmur appreciated  Lung: clear to auscultation bilaterally  Abdomen: soft, NT/ND, no hepatomegaly  Legs: no swelling or edema        Labs : I reviewed data from epic and extract and summarize the pertinent data here.   Lab Results   Component Value Date     11/29/2018      Lab Results   Component Value Date    POTASSIUM 3.6 11/29/2018     Lab Results   Component Value Date    CHLORIDE 104 11/29/2018     Lab Results   Component Value Date    BECKY 9.3 11/29/2018     Lab Results   Component Value Date    CO2 27 11/29/2018     Lab Results   Component Value Date    BUN 16 11/29/2018     Lab Results   Component Value Date    CR 0.59 11/29/2018     Lab Results   Component Value Date    GLC 82 11/29/2018     Lab Results   Component Value Date    TSH 1.93 11/29/2018     Lab Results   Component Value Date    T4 0.98 11/29/2018     No results found for: A1C    No results found for: IGF1  Lab Results   Component Value Date    LH 11.1 11/29/2018     Lab Results   Component Value Date    FSH 3.1 11/29/2018     No results found for: TESTOSTFREE  Lab Results   Component Value Date    ESTROGEN 70 11/29/2018     Lab Results   Component Value Date    PROLACTIN 5 05/28/2019     No results found for: PROG      MRI Brain: I personally reviewed the original images and agree with the below reports.   Results for orders placed in visit on 01/05/19   MRI Brain-PITUITARY  w & w/o contrast    Narrative Brain/ Pituitary MRI without and with contrast    History: Infertility, galactorrhea; 27 yo female  prolactinoma,  previously micro in [place].; Prolactinoma (H).  ICD-10: Prolactinoma (H)    Comparison:  None available     Technique: Axial diffusion and FLAIR images of the whole brain  obtained without intravenous contrast. Sagittal T1 and T2-weighted,  coronal T2-weighted, coronal T1-weighted images with focus on the  sella were obtained without intravenous contrast. Post intravenous  contrast using gadolinium coronal and sagittal T1-weighted images were  obtained focused on the sella. Dynamic postcontrast coronal  T1-weighted images were also obtained.    Contrast: 7.5mL Gadavist    Findings:    There is a hypoenhancing lesion within the right posterior pituitary  gland, which measures  6.0 x 3.0 x 3.0 mm, which demonstrates T1  signal that is hyperintense to the adenohypophysis and hypointense  relative to the neurohypophysis on precontrast images. The pituitary  stalk appears midline. The cavernous sinuses are clear. The optic  apparatus appears intact and not displaced.  The major cavernous  carotid vascular flow-voids appear patent.      There is no midline shift, mass effect or extra-axial fluid  collection. Ventricles are proportionate to the cerebral sulci. Flow  voids within the major intracranial vessels are present.  Diffusion-weighted images reveal no abnormal reduced diffusion. No  abnormal intracranial enhancement.    Nasal sinuses and mastoid air cells are relatively clear. Orbits are  grossly unremarkable.      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 Microadenoma is  possible.    I have personally reviewed the examination and initial interpretation  and I agree with the findings.    SERAFIN KILPATRICK MD

## 2019-08-06 NOTE — LETTER
8/6/2019       RE: Makeda Perales  [address]  [place] [address]     Dear Colleague,    Thank you for referring your patient, Makeda Perales, to the [facility] ENDOCRINOLOGY at [facility]. Please see a copy of my visit note below.                                                                               - Endocrinology Follow up -    Reason for visit/consult: elevated PRL, galactorrhea, pituitary tumor    Primary care provider: Dio Brown        Assessment and Plan  28 year old female with history of micro prolactinoma    # prolactinoma  Patient was diagnosed prolactinoma in [place] with microadenoma dictating MRI, it seems responded to by bromocriptine, by the time she came to [facility] MRI showed no sign of tumor possibly shrunk the bromocriptine.   However repeated the prolactin level upon the first visit was 51, with possible microadenoma on the right intrasellar, therefore we started cabergoline 0.25 mg weekly patient is compliant and tolerant to medication.  Her prolactin level dropped from 51 down to 5 in May 2019.  Also patient started to have regular menstrual cycle for the past few months.    -Continue current cabergoline 0.25 mg weekly  -Next MRI in January 2019  - PRL to check in January 2019 prior to next visit      # Persistent galactorrhea  Due to elevated PRL, now PRL normalized and galactorrhea resolved.    -Continue current cabergoline 0.25 mg weekly  - PRL to check in January 2019 prior to next visit    # irregular cycles  Resolved with normalized PRL    -Continue current cabergoline 0.25 mg weekly      I spent 30 minutes with this patient face to face and explained the conditions and plans (more than 50% of time was counseling/coordination of care) . The patient understood and is satisfied with today's visit. Return to clinic with me in 6 month.         Colleen Rodríguez MD  Staff Physician  Endocrinology and Metabolism  License: [id number]      Interval History  as of 8/6/2019: Patient has been doing well. Last seen in 12/2018 . Medication compliance excellent  . New event includes galactorrhea and irregular menses resolved .    HPI: A 29 yo female with history of pituitary microadenoma since 2012, previously managed by Bromocriptine 2.5mg BID, which was stopped when she became pregnant. MRI done in 10/2014 showed no discrete pituitary lesion, probably due to tumor shrinkage after Bromocriptine. She had an uneventful pregnancy and delivered a daughter in June 2017. Bromocriptine was not resumed, as prolactin levels were normal,    She became pregnant in end of 2016 and Bromocriptine was stopped. She delivered a female baby in June 2017 and was breastfeeding until April 2018. Bromocriptine was not resumed, as prolactin levels were normal.    Her primary concern is delayed periods, last cycle was 2 months. LMP 12/10/2018  She reports mild galactorrhea even now.    She is stressed due to work recently  Her energy level has been good.   No headaches, no visual disturbances, no dizziness.   She denies nausea, no change in weight; apetite good, sleeping well; no heat/cold intolerance        Past Medical/Surgical History:  Past Medical History:   Diagnosis Date     Prolactinoma (H)      History reviewed. No pertinent surgical history.    Allergies:  No Known Allergies    Current Medications   Current Outpatient Medications   Medication     cabergoline (DOSTINEX) 0.5 MG tablet     No current facility-administered medications for this visit.        Family History:  History reviewed. No pertinent family history.    Social History:  Social History     Tobacco Use     Smoking status: Never Smoker     Smokeless tobacco: Never Used   Substance Use Topics     Alcohol use: Not on file   She denies smoking, drinking alcohol or using illicit drugs.   Occupation: Post doctorate in electrical engineering at the [employer]  She shifted to [place] in [place], did PhD at [employer] [facility] and shifted to [place] in  August 2018    ROS:  Full review of systems taken with the help of the intake sheet. Otherwise a complete 14 point review of systems was taken and is negative unless stated in the history above.      Physical Exam:   BP 96/65   Pulse 83   Wt 51 kg (112 lb 8 oz)      General: well appearing, no acute distress, pleasant and conversant,   Mental Status/neuro: alert and oriented  Face: symmetrical, normal facial color  Eyes: anicteric, PERRL, no proptosis or lid lag  Visual field: intact  Occular motor: full  Neck: suppler, no lymphadenopahty  Thyroid: normal size and texture, no nodule palpable, no bruits  Breast: mild galactorrhea bilateral  Heart: regular rhythm, S1S2, no murmur appreciated  Lung: clear to auscultation bilaterally  Abdomen: soft, NT/ND, no hepatomegaly  Legs: no swelling or edema        Labs : I reviewed data from epic and extract and summarize the pertinent data here.   Lab Results   Component Value Date     11/29/2018      Lab Results   Component Value Date    POTASSIUM 3.6 11/29/2018     Lab Results   Component Value Date    CHLORIDE 104 11/29/2018     Lab Results   Component Value Date    BECKY 9.3 11/29/2018     Lab Results   Component Value Date    CO2 27 11/29/2018     Lab Results   Component Value Date    BUN 16 11/29/2018     Lab Results   Component Value Date    CR 0.59 11/29/2018     Lab Results   Component Value Date    GLC 82 11/29/2018     Lab Results   Component Value Date    TSH 1.93 11/29/2018     Lab Results   Component Value Date    T4 0.98 11/29/2018     No results found for: A1C    No results found for: IGF1  Lab Results   Component Value Date    LH 11.1 11/29/2018     Lab Results   Component Value Date    FSH 3.1 11/29/2018     No results found for: TESTOSTFREE  Lab Results   Component Value Date    ESTROGEN 70 11/29/2018     Lab Results   Component Value Date    PROLACTIN 5 05/28/2019     No results found for: PROG      MRI Brain: I personally reviewed the original images  and agree with the below reports.   Results for orders placed in visit on 01/05/19   MRI Brain-PITUITARY  w & w/o contrast    Narrative Brain/ Pituitary MRI without and with contrast    History: Infertility, galactorrhea; 27 yo female prolactinoma,  previously micro in [place].; Prolactinoma (H).  ICD-10: Prolactinoma (H)    Comparison:  None available     Technique: Axial diffusion and FLAIR images of the whole brain  obtained without intravenous contrast. Sagittal T1 and T2-weighted,  coronal T2-weighted, coronal T1-weighted images with focus on the  sella were obtained without intravenous contrast. Post intravenous  contrast using gadolinium coronal and sagittal T1-weighted images were  obtained focused on the sella. Dynamic postcontrast coronal  T1-weighted images were also obtained.    Contrast: 7.5mL Gadavist    Findings:    There is a hypoenhancing lesion within the right posterior pituitary  gland, which measures  6.0 x 3.0 x 3.0 mm, which demonstrates T1  signal that is hyperintense to the adenohypophysis and hypointense  relative to the neurohypophysis on precontrast images. The pituitary  stalk appears midline. The cavernous sinuses are clear. The optic  apparatus appears intact and not displaced.  The major cavernous  carotid vascular flow-voids appear patent.      There is no midline shift, mass effect or extra-axial fluid  collection. Ventricles are proportionate to the cerebral sulci. Flow  voids within the major intracranial vessels are present.  Diffusion-weighted images reveal no abnormal reduced diffusion. No  abnormal intracranial enhancement.    Nasal sinuses and mastoid air cells are relatively clear. Orbits are  grossly unremarkable.      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 Microadenoma is  possible.    I have personally reviewed the examination and initial interpretation  and I agree with the  findings.    SERAFIN KILPATRICK MD       Again, thank you for allowing me to participate in the care of your patient.      Sincerely,    Colleen Rodríguez MD

## 2019-08-16 ENCOUNTER — MYC REFILL (OUTPATIENT)
Dept: ENDOCRINOLOGY | Facility: CLINIC | Age: 29
End: 2019-08-16

## 2019-08-16 DIAGNOSIS — D35.2 PROLACTINOMA (H): ICD-10-CM

## 2019-08-19 RX ORDER — CABERGOLINE 0.5 MG/1
0.25 TABLET ORAL WEEKLY
Qty: 6 TABLET | Refills: 2 | Status: SHIPPED | OUTPATIENT
Start: 2019-08-19 | End: 2019-12-17

## 2019-08-27 ENCOUNTER — COMMUNICATION - HEALTHEAST (OUTPATIENT)
Dept: HEALTH INFORMATION MANAGEMENT | Facility: CLINIC | Age: 29
End: 2019-08-27

## 2019-12-17 ENCOUNTER — MYC REFILL (OUTPATIENT)
Dept: ENDOCRINOLOGY | Facility: CLINIC | Age: 29
End: 2019-12-17

## 2019-12-17 DIAGNOSIS — D35.2 PROLACTINOMA (H): ICD-10-CM

## 2019-12-18 RX ORDER — CABERGOLINE 0.5 MG/1
TABLET ORAL
Qty: 6 TABLET | Refills: 0 | Status: SHIPPED | OUTPATIENT
Start: 2019-12-18 | End: 2020-01-06

## 2020-01-06 ENCOUNTER — OFFICE VISIT (OUTPATIENT)
Dept: ENDOCRINOLOGY | Facility: CLINIC | Age: 30
End: 2020-01-06
Payer: COMMERCIAL

## 2020-01-06 ENCOUNTER — ANCILLARY PROCEDURE (OUTPATIENT)
Dept: MRI IMAGING | Facility: CLINIC | Age: 30
End: 2020-01-06
Attending: INTERNAL MEDICINE
Payer: COMMERCIAL

## 2020-01-06 VITALS
HEART RATE: 73 BPM | BODY MASS INDEX: 18.69 KG/M2 | SYSTOLIC BLOOD PRESSURE: 95 MMHG | HEIGHT: 65 IN | DIASTOLIC BLOOD PRESSURE: 60 MMHG | WEIGHT: 112.2 LBS

## 2020-01-06 DIAGNOSIS — D35.2 PROLACTINOMA (H): ICD-10-CM

## 2020-01-06 DIAGNOSIS — D49.7 PITUITARY TUMOR: Primary | ICD-10-CM

## 2020-01-06 DIAGNOSIS — N64.3 GALACTORRHEA: ICD-10-CM

## 2020-01-06 LAB — PROLACTIN SERPL-MCNC: 4 UG/L (ref 3–27)

## 2020-01-06 RX ORDER — GADOBUTROL 604.72 MG/ML
7.5 INJECTION INTRAVENOUS ONCE
Status: COMPLETED | OUTPATIENT
Start: 2020-01-06 | End: 2020-01-06

## 2020-01-06 RX ORDER — CABERGOLINE 0.5 MG/1
TABLET ORAL
Qty: 6 TABLET | Refills: 5 | Status: SHIPPED | OUTPATIENT
Start: 2020-01-06 | End: 2020-12-10

## 2020-01-06 RX ADMIN — GADOBUTROL 5 ML: 604.72 INJECTION INTRAVENOUS at 12:09

## 2020-01-06 ASSESSMENT — PAIN SCALES - GENERAL: PAINLEVEL: NO PAIN (0)

## 2020-01-06 ASSESSMENT — MIFFLIN-ST. JEOR: SCORE: 1231.88

## 2020-01-06 NOTE — PROGRESS NOTES
- Endocrinology Follow up -    Reason for visit/consult: elevated PRL, galactorrhea, pituitary tumor    Primary care provider: Dio Brown        Assessment and Plan  A 29 year old female with history of micro prolactinoma    # Prolactinoma  Patient was diagnosed prolactinoma in [place] with microadenoma dictating MRI, it seems responded to by bromocriptine, by the time she came to [facility] MRI showed no sign of tumor possibly shrunk the bromocriptine.   However repeated the prolactin level upon the first visit was 51, with possible microadenoma on the right intrasellar, therefore we started cabergoline 0.25 mg weekly patient is compliant and tolerant to medication.  Her prolactin level dropped from 51 down to 5 in May 2019.  Also patient started to have regular menstrual cycle after that. Also no galactorrhea.     -Continue current cabergoline 0.25 mg weekly    - PRL to check today    # Pituitary tumor  Went to MRI today but my personal review, no significant change in size.     # Persistent galactorrhea  Breast exam done today, resolved.      # irregular cycles  Resolved     -Continue current cabergoline 0.25 mg weekly      I spent 30 minutes with this patient face to face and explained the conditions and plans (more than 50% of time was counseling/coordination of care) . The patient understood and is satisfied with today's visit. Return to clinic with me in 6 month.         Colleen Rodríguez MD  Staff Physician  Endocrinology and Metabolism  License: [id number]    Interval History as of 1/6/2020 : Patient has been doing well. Last seen 1 year ago. Medication compliance : excellent, toleratign cabergoline well  . New event includes: none  .  Interval History as of 8/6/2019: Patient has been doing well. Last seen in 12/2018 . Medication compliance excellent  . New event includes galactorrhea and irregular menses resolved .    HPI: A 28  yo female with history of pituitary microadenoma since 2012, previously managed by Bromocriptine 2.5mg BID, which was stopped when she became pregnant. MRI done in 10/2014 showed no discrete pituitary lesion, probably due to tumor shrinkage after Bromocriptine. She had an uneventful pregnancy and delivered a daughter in June 2017. Bromocriptine was not resumed, as prolactin levels were normal,    She became pregnant in end of 2016 and Bromocriptine was stopped. She delivered a female baby in June 2017 and was breastfeeding until April 2018. Bromocriptine was not resumed, as prolactin levels were normal.    Her primary concern is delayed periods, last cycle was 2 months. LMP 12/10/2018  She reports mild galactorrhea even now.    She is stressed due to work recently  Her energy level has been good.   No headaches, no visual disturbances, no dizziness.   She denies nausea, no change in weight; apetite good, sleeping well; no heat/cold intolerance        Past Medical/Surgical History:  Past Medical History:   Diagnosis Date     Prolactinoma (H)      No past surgical history on file.    Allergies:  No Known Allergies    Current Medications   Current Outpatient Medications   Medication     cabergoline (DOSTINEX) 0.5 MG tablet     No current facility-administered medications for this visit.        Family History:  No family history on file.    Social History:  Social History     Tobacco Use     Smoking status: Never Smoker     Smokeless tobacco: Never Used   Substance Use Topics     Alcohol use: Not on file   She denies smoking, drinking alcohol or using illicit drugs.   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 Otherwise a complete 14 point review of systems was taken and is negative unless stated in the history above.      Physical Exam:  "  BP 95/60   Pulse 73   Ht 1.638 m (5' 4.5\")   Wt 50.9 kg (112 lb 3.2 oz)   BMI 18.96 kg/m       General: well appearing, no acute distress, pleasant and conversant,   Mental Status/neuro: alert and oriented  Face: symmetrical, normal facial color  Eyes: anicteric, PERRL, no proptosis or lid lag  Visual field: intact  Occular motor: full  Neck: suppler, no lymphadenopahty  Thyroid: normal size and texture, no nodule palpable, no bruits  Breast: mild galactorrhea bilateral  Heart: regular rhythm, S1S2, no murmur appreciated  Lung: clear to auscultation bilaterally  Abdomen: soft, NT/ND, no hepatomegaly  Legs: no swelling or edema        Labs : I reviewed data from epic and extract and summarize the pertinent data here.      Ref. Range 12/17/2018 09:48 5/28/2019 09:38   Prolactin Latest Ref Range: 3 - 27 ug/L 51 (H) 5       MRI Brain: I personally reviewed the original images and agree with the below reports.   1/6/2020.                Results for orders placed in visit on 01/05/19   MRI Brain-PITUITARY  w & w/o contrast    Narrative Brain/ Pituitary MRI without and with contrast    History: Infertility, galactorrhea; 29 yo female prolactinoma,  previously micro in [place].; Prolactinoma (H).  ICD-10: Prolactinoma (H)    Comparison:  None available     Technique: Axial diffusion and FLAIR images of the whole brain  obtained without intravenous contrast. Sagittal T1 and T2-weighted,  coronal T2-weighted, coronal T1-weighted images with focus on the  sella were obtained without intravenous contrast. Post intravenous  contrast using gadolinium coronal and sagittal T1-weighted images were  obtained focused on the sella. Dynamic postcontrast coronal  T1-weighted images were also obtained.    Contrast: 7.5mL Gadavist    Findings:    There is a hypoenhancing lesion within the right posterior pituitary  gland, which measures  6.0 x 3.0 x 3.0 mm, which demonstrates T1  signal that is hyperintense to the adenohypophysis and " hypointense  relative to the neurohypophysis on precontrast images. The pituitary  stalk appears midline. The cavernous sinuses are clear. The optic  apparatus appears intact and not displaced.  The major cavernous  carotid vascular flow-voids appear patent.      There is no midline shift, mass effect or extra-axial fluid  collection. Ventricles are proportionate to the cerebral sulci. Flow  voids within the major intracranial vessels are present.  Diffusion-weighted images reveal no abnormal reduced diffusion. No  abnormal intracranial enhancement.    Nasal sinuses and mastoid air cells are relatively clear. Orbits are  grossly unremarkable.      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 Microadenoma is  possible.    I have personally reviewed the examination and initial interpretation  and I agree with the findings.    SERAFIN KILPATRICK MD

## 2020-01-06 NOTE — LETTER
1/6/2020       RE: Makeda Perales  [address] [address]     Dear Colleague,    Thank you for referring your patient, Makeda Perales, to the [facility] ENDOCRINOLOGY at [facility]. Please see a copy of my visit note below.                                                                               - Endocrinology Follow up -    Reason for visit/consult: elevated PRL, galactorrhea, pituitary tumor    Primary care provider: Dio Brown        Assessment and Plan  A 29 year old female with history of micro prolactinoma    # Prolactinoma  Patient was diagnosed prolactinoma in [place] with microadenoma dictating MRI, it seems responded to by bromocriptine, by the time she came to [facility] MRI showed no sign of tumor possibly shrunk the bromocriptine.   However repeated the prolactin level upon the first visit was 51, with possible microadenoma on the right intrasellar, therefore we started cabergoline 0.25 mg weekly patient is compliant and tolerant to medication.  Her prolactin level dropped from 51 down to 5 in May 2019.  Also patient started to have regular menstrual cycle after that. Also no galactorrhea.     -Continue current cabergoline 0.25 mg weekly    - PRL to check today    # Pituitary tumor  Went to MRI today but my personal review, no significant change in size.     # Persistent galactorrhea  Breast exam done today, resolved.      # irregular cycles  Resolved     -Continue current cabergoline 0.25 mg weekly      I spent 30 minutes with this patient face to face and explained the conditions and plans (more than 50% of time was counseling/coordination of care) . The patient understood and is satisfied with today's visit. Return to clinic with me in 6 month.         Colleen Rodríguez MD  Staff Physician  Endocrinology and Metabolism  License: [id number]    Interval History as of 1/6/2020 : Patient has been doing well. Last seen 1 year ago. Medication compliance :  excellent, toleratign cabergoline well  . New event includes: none  .  Interval History as of 8/6/2019: Patient has been doing well. Last seen in 12/2018 . Medication compliance excellent  . New event includes galactorrhea and irregular menses resolved .    HPI: A 29 yo female with history of pituitary microadenoma since 2012, previously managed by Bromocriptine 2.5mg BID, which was stopped when she became pregnant. MRI done in 10/2014 showed no discrete pituitary lesion, probably due to tumor shrinkage after Bromocriptine. She had an uneventful pregnancy and delivered a daughter in June 2017. Bromocriptine was not resumed, as prolactin levels were normal,    She became pregnant in end of 2016 and Bromocriptine was stopped. She delivered a female baby in June 2017 and was breastfeeding until April 2018. Bromocriptine was not resumed, as prolactin levels were normal.    Her primary concern is delayed periods, last cycle was 2 months. LMP 12/10/2018  She reports mild galactorrhea even now.    She is stressed due to work recently  Her energy level has been good.   No headaches, no visual disturbances, no dizziness.   She denies nausea, no change in weight; apetite good, sleeping well; no heat/cold intolerance        Past Medical/Surgical History:  Past Medical History:   Diagnosis Date     Prolactinoma (H)      No past surgical history on file.    Allergies:  No Known Allergies    Current Medications   Current Outpatient Medications   Medication     cabergoline (DOSTINEX) 0.5 MG tablet     No current facility-administered medications for this visit.        Family History:  No family history on file.    Social History:  Social History     Tobacco Use     Smoking status: Never Smoker     Smokeless tobacco: Never Used   Substance Use Topics     Alcohol use: Not on file   She denies smoking, drinking alcohol or using illicit drugs.   Occupation: Post doctorate in electrical engineering at the [employer]  She shifted to [facility] in  "2013, did PhD at [employer] and shifted to [place] in August 2018    ROS:  Full review of systems taken with the help of the intake sheet. Otherwise a complete 14 point review of systems was taken and is negative unless stated in the history above.      Physical Exam:   BP 95/60   Pulse 73   Ht 1.638 m (5' 4.5\")   Wt 50.9 kg (112 lb 3.2 oz)   BMI 18.96 kg/m        General: well appearing, no acute distress, pleasant and conversant,   Mental Status/neuro: alert and oriented  Face: symmetrical, normal facial color  Eyes: anicteric, PERRL, no proptosis or lid lag  Visual field: intact  Occular motor: full  Neck: suppler, no lymphadenopahty  Thyroid: normal size and texture, no nodule palpable, no bruits  Breast: mild galactorrhea bilateral  Heart: regular rhythm, S1S2, no murmur appreciated  Lung: clear to auscultation bilaterally  Abdomen: soft, NT/ND, no hepatomegaly  Legs: no swelling or edema        Labs : I reviewed data from epic and extract and summarize the pertinent data here.      Ref. Range 12/17/2018 09:48 5/28/2019 09:38   Prolactin Latest Ref Range: 3 - 27 ug/L 51 (H) 5       MRI Brain: I personally reviewed the original images and agree with the below reports.   1/6/2020.                Results for orders placed in visit on 01/05/19   MRI Brain-PITUITARY  w & w/o contrast    Narrative Brain/ Pituitary MRI without and with contrast    History: Infertility, galactorrhea; 27 yo female prolactinoma,  previously micro in [place].; Prolactinoma (H).  ICD-10: Prolactinoma (H)    Comparison:  None available     Technique: Axial diffusion and FLAIR images of the whole brain  obtained without intravenous contrast. Sagittal T1 and T2-weighted,  coronal T2-weighted, coronal T1-weighted images with focus on the  sella were obtained without intravenous contrast. Post intravenous  contrast using gadolinium coronal and sagittal T1-weighted images were  obtained focused on the sella. Dynamic postcontrast " coronal  T1-weighted images were also obtained.    Contrast: 7.5mL Gadavist    Findings:    There is a hypoenhancing lesion within the right posterior pituitary  gland, which measures  6.0 x 3.0 x 3.0 mm, which demonstrates T1  signal that is hyperintense to the adenohypophysis and hypointense  relative to the neurohypophysis on precontrast images. The pituitary  stalk appears midline. The cavernous sinuses are clear. The optic  apparatus appears intact and not displaced.  The major cavernous  carotid vascular flow-voids appear patent.      There is no midline shift, mass effect or extra-axial fluid  collection. Ventricles are proportionate to the cerebral sulci. Flow  voids within the major intracranial vessels are present.  Diffusion-weighted images reveal no abnormal reduced diffusion. No  abnormal intracranial enhancement.    Nasal sinuses and mastoid air cells are relatively clear. Orbits are  grossly unremarkable.      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 Microadenoma is  possible.    I have personally reviewed the examination and initial interpretation  and I agree with the findings.    SERAFIN KILPATRICK MD       Again, thank you for allowing me to participate in the care of your patient.      Sincerely,    Colleen Rodríguez MD

## 2020-03-11 ENCOUNTER — HEALTH MAINTENANCE LETTER (OUTPATIENT)
Age: 30
End: 2020-03-11

## 2020-12-07 DIAGNOSIS — D35.2 PROLACTINOMA (H): ICD-10-CM

## 2020-12-10 RX ORDER — CABERGOLINE 0.5 MG/1
TABLET ORAL
Qty: 6 TABLET | Refills: 3 | Status: SHIPPED | OUTPATIENT
Start: 2020-12-10 | End: 2021-03-29

## 2020-12-10 NOTE — TELEPHONE ENCOUNTER
CABERGOLINE 0.5 MG TABLET      Last Written Prescription Date:  1-6-20  Last Fill Quantity: 6,   # refills: 5  Last Office Visit : 1-6-20  Future Office visit:  none    Routing refill request to provider for review/approval because:  Med not on Endo protocol

## 2021-01-03 ENCOUNTER — HEALTH MAINTENANCE LETTER (OUTPATIENT)
Age: 31
End: 2021-01-03

## 2021-03-27 DIAGNOSIS — D35.2 PROLACTINOMA (H): ICD-10-CM

## 2021-03-29 RX ORDER — CABERGOLINE 0.5 MG/1
TABLET ORAL
Qty: 6 TABLET | Refills: 3 | Status: SHIPPED | OUTPATIENT
Start: 2021-03-29 | End: 2022-01-11

## 2021-03-29 NOTE — TELEPHONE ENCOUNTER
cabergoline (DOSTINEX) 0.5 MG tablet        Last Written Prescription Date:  12/10/2020  Last Fill Quantity: 6,   # refills: 3  Last Office Visit : 01/06/2020  Future Office visit:  None     Routing refill request to provider for review/approval because: not on protocol

## 2021-04-06 ENCOUNTER — OFFICE VISIT - HEALTHEAST (OUTPATIENT)
Dept: FAMILY MEDICINE | Facility: CLINIC | Age: 31
End: 2021-04-06

## 2021-04-06 ENCOUNTER — NURSE TRIAGE (OUTPATIENT)
Dept: NURSING | Facility: CLINIC | Age: 31
End: 2021-04-06

## 2021-04-06 DIAGNOSIS — R09.81 NASAL CONGESTION: ICD-10-CM

## 2021-04-06 NOTE — TELEPHONE ENCOUNTER
"Patient calling reporting nasal congestion starting 2-3 years ago that comes and goes. \"Sometimes 1 nose is stuffed.\" Patient reporting current symptoms lasting longer then 2 weeks. Afebrile. Denies other symptoms.    Disposition to see provider today or tomorrow in clinic.  Transferred to Central Scheduling.    Randi Grayson RN  [facility]      COVID 19 Nurse Triage Plan/Patient Instructions    Please be aware that novel coronavirus (COVID-19) may be circulating in the community. If you develop symptoms such as fever, cough, or SOB or if you have concerns about the presence of another infection including coronavirus (COVID-19), please contact your health care provider or visit [email or web address].     Disposition/Instructions    Virtual Visit with provider recommended. Reference Visit Selection Guide.    Thank you for taking steps to prevent the spread of this virus.  o Limit your contact with others.  o Wear a simple mask to cover your cough.  o Wash your hands well and often.    Resources    [facility]: About COVID-19: [email or web address]    CDC: What to Do If You're Sick: www.cdc.gov/coronavirus/2019-ncov/about/steps-when-sick.html    CDC: Ending Home Isolation: www.cdc.gov/coronavirus/2019-ncov/hcp/disposition-in-home-patients.html     CDC: Caring for Someone: www.cdc.gov/coronavirus/2019-ncov/if-you-are-sick/care-for-someone.html     [facility]: Interim Guidance for Hospital Discharge to Home: www.health.[facility].[place].us/diseases/coronavirus/hcp/hospdischarge.pdf    [facility] clinical trials (COVID-19 research studies): clinicalaffairs.[facility]-clinical-trials     Below are the COVID-19 hotlines at the [facility] of Health ([facility]). Interpreters are available.   o For health questions: Call 239-536-6575 or 1-472.390.8231 (7 a.m. to 7 p.m.)  o For questions about schools and childcare: Call 780-339-9128 or 1-467.283.1939 (7 a.m. to 7 p.m.) "                       Additional Information    Negative: Sounds like a life-threatening emergency to the triager    Negative: Difficulty breathing, and not from stuffy nose (e.g., not relieved by cleaning out the nose)    Negative: SEVERE headache and has fever    Negative: Patient sounds very sick or weak to the triager    Negative: SEVERE sinus pain    Negative: Severe headache    Negative: Redness or swelling on the cheek, forehead, or around the eye    Negative: Fever > 103 F (39.4 C)    Negative: Fever > 101 F (38.3 C) and over 60 years of age    Negative: Fever > 100.0 F (37.8 C) and has diabetes mellitus or a weak immune system (e.g., HIV positive, cancer chemotherapy, organ transplant, splenectomy, chronic steroids)    Negative: Fever > 100.0 F (37.8 C) and bedridden (e.g., nursing home patient, stroke, chronic illness, recovering from surgery)    Negative: Fever present > 3 days (72 hours)    Negative: Fever returns after gone for over 24 hours and symptoms worse or not improved    Negative: Sinus pain (not just congestion) and fever    Negative: Earache    Sinus congestion (pressure, fullness) present > 10 days    Protocols used: SINUS PAIN AND CONGESTION-A-OH

## 2021-04-25 ENCOUNTER — HEALTH MAINTENANCE LETTER (OUTPATIENT)
Age: 31
End: 2021-04-25

## 2021-04-29 ENCOUNTER — COMMUNICATION - HEALTHEAST (OUTPATIENT)
Dept: FAMILY MEDICINE | Facility: CLINIC | Age: 31
End: 2021-04-29

## 2021-04-29 DIAGNOSIS — R09.81 NASAL CONGESTION: ICD-10-CM

## 2021-06-16 NOTE — PROGRESS NOTES
Makeda Perales is a 30 y.o. female who is being evaluated via a billable video visit.      How would you like to obtain your AVS? MyChart.    Will anyone else be joining your video visit? No      Video Start Time: 4:36 PM    Assessment & Plan     Makeda was seen today for nasal congestion and itchy and dry inside nose.    Diagnoses and all orders for this visit:    Nasal congestion  Start flonase and zyrtec  Call in 2 wks with update  May need to see ENT if not better  -     fluticasone propionate (FLONASE) 50 mcg/actuation nasal spray; Instill 1 spray in each nostril daily  -     cetirizine (ZYRTEC) 10 MG tablet; Take 1 tablet (10 mg total) by mouth daily.      Rowdy Melendez MD  [facility]   Makeda Perales is 30 y.o. and presents today for the following health issues   HPI     Nasal congestion on/off year round for 3-4 years  Itchy & dry nose on/off year round for 3-4 years  Has red & watery eyes during the pollen season  Sneezing a lot  No cough, wheezing, shortness of breath  No known seasonal allergies but thinks she may be allergic to dust & pollen  Has tried saline nasal wash  Mother and younger brother has rhinitis    PMH: hyperprolactinemia  Meds: cabergoline  PSH: none  Personal:  Not a smoker      Objective       Vitals:  No vitals were obtained today due to virtual visit.    Physical Exam  Constitutional: Patient is oriented to person, place, and time. Patient appears well-developed and well-nourished. No distress.   Head: Normocephalic and atraumatic.   Right Ear: External ear normal.   Left Ear: External ear normal.   Eyes: Conjunctivae and EOM are normal. Right eye exhibits no discharge. Left eye exhibits no discharge. No scleral icterus.   Neurological: Patient is alert and oriented to person, place, and time.  Coordination normal.   Skin: No rash noted. Patient is not diaphoretic. No erythema. No pallor.          Video-Visit Details    Type of service:   Video Visit    Video End Time (time video stopped): 4:56 PM  Originating Location (pt. Location): Home    Distant Location (provider location):  [facility]     Platform used for Video Visit: Deondre

## 2021-06-17 NOTE — TELEPHONE ENCOUNTER
Patient had VV with you on 4/6/21. Please advise on refills.    Peggy RICO CMA ([facility])

## 2021-06-17 NOTE — TELEPHONE ENCOUNTER
Former patient of ??? & has not established care with another provider.  Please assign refill request to covering provider per Clinic standard process.      RN cannot approve Refill Request    RN can NOT refill this medication no pcp. Last office visit: Visit date not found Last Physical: Visit date not found Last MTM visit: Visit date not found Last visit same specialty: Visit date not found.  Next visit within 3 mo: Visit date not found  Next physical within 3 mo: Visit date not found      Viktor Monge, [facility] Triage/Med Refill 4/30/2021    Requested Prescriptions   Pending Prescriptions Disp Refills     cetirizine (ZYRTEC) 10 MG tablet [Pharmacy Med Name: CETIRIZINE HCL 10 MG TABLET] 30 tablet 0     Sig: TAKE 1 TABLET BY MOUTH EVERY DAY       Antihistamine Refill Protocol Passed - 4/29/2021  1:34 PM        Passed - Patient has had office visit/physical in last year     Last office visit with prescriber/PCP: Visit date not found OR same dept: Visit date not found OR same specialty: Visit date not found  Last physical: Visit date not found Last MTM visit: Visit date not found   Next visit within 3 mo: Visit date not found  Next physical within 3 mo: Visit date not found  Prescriber OR PCP: Rowdy Melendez MD  Last diagnosis associated with med order: 1. Nasal congestion  - cetirizine (ZYRTEC) 10 MG tablet [Pharmacy Med Name: CETIRIZINE HCL 10 MG TABLET]; TAKE 1 TABLET BY MOUTH EVERY DAY  Dispense: 30 tablet; Refill: 0  - fluticasone propionate (FLONASE) 50 mcg/actuation nasal spray [Pharmacy Med Name: FLUTICASONE PROP 50 MCG SPRAY]; Instill 1 spray in each nostril daily  Dispense: 16 g; Refill: 1    If protocol passes may refill for 12 months if within 3 months of last provider visit (or a total of 15 months).                fluticasone propionate (FLONASE) 50 mcg/actuation nasal spray [Pharmacy Med Name: FLUTICASONE PROP 50 MCG SPRAY] 16 g 1     Sig: INSTILL 1 SPRAY IN EACH NOSTRIL DAILY        Nasal Steroid Refill Protocol Passed - 4/29/2021  1:34 PM        Passed - Patient has had office visit/physical in last 2 years     Last office visit with prescriber/PCP: Visit date not found OR same dept: Visit date not found OR same specialty: Visit date not found Last physical: Visit date not found Last MTM visit: Visit date not found    Next appt within 3 mo: Visit date not found  Next physical within 3 mo: Visit date not found  Prescriber OR PCP: Rowdy Melendez MD  Last diagnosis associated with med order: 1. Nasal congestion  - cetirizine (ZYRTEC) 10 MG tablet [Pharmacy Med Name: CETIRIZINE HCL 10 MG TABLET]; TAKE 1 TABLET BY MOUTH EVERY DAY  Dispense: 30 tablet; Refill: 0  - fluticasone propionate (FLONASE) 50 mcg/actuation nasal spray [Pharmacy Med Name: FLUTICASONE PROP 50 MCG SPRAY]; Instill 1 spray in each nostril daily  Dispense: 16 g; Refill: 1     If protocol passes may refill for 12 months if within 3 months of last provider visit (or a total of 15 months).

## 2021-06-19 NOTE — LETTER
Letter by Kojo Rich at      Author: Kojo Rich Service: -- Author Type: --    Filed:  Encounter Date: 2019 Status: (Other)         2019       Makeda Perales  [address] [place] [id number]    Dear Makeda Perales:    We are pleased to provide you with secure, online access to medical information for you and your family. Per your request, we have expanded your account to allow access to the records of the following family members:              Viry Erickson (privilege ends on 2029.)     How Do I Login?  1. In your Internet browser, go to [email or web address].  2. Click on Sign Up Now   3. Enter your [employer] Activation Code exactly as it appears below. This code will  60 days after it is generated. You will not need to use this code after you have completed the sign-up process. If you do not sign up before the expiration date, you must request a new code.     [employer] Activation Code: [email or web address]  Expires: 10/26/2019  3:37 PM    4. Enter in your date of birth and zip code.  5. Create a [employer] username. Think of one that is secure and easy to remember.  Your username must be between 6 and 20 characters.  6. Create a [employer] password. You can change your password at any time. Your password must be between 8 and 45 characters, contain at least two letters and one number, and contain both upper and lower case letters.  7. Choose a security question, enter your answer, and click Next. This can be used to access [employer] if you forget your password.   8. Enter a valid e-mail address to receive e-mail notifications when new information is available in [employer].  9. Click Sign In.        How Do I Access a Family Member's Account?  10. Select the account you want to access by clicking the [place] [employer] [place] with the appropriate patient's name at the top of your screen.   11. You will see a disclaimer page letting you know that you will be viewing a family member's record. Review the disclaimer  and then click Accept Proxy Access Disclaimer to proceed.  12. Once you switch to viewing a family member's record, you can navigate to [employer] pages the same way you would for yourself. You can return to your own account by clicking the [place] at the top of the screen with your name on it.    13. To customize colors and names of the linked accounts, you can select Personalize from the Profile dropdown menu at the top of the screen, then click the Edit button to make changes.      Additional Information  If you have questions, you can e-mail [email or web address] or call [phone] to talk to our [facility] [employer] staff. Remember, [employer] is NOT to be used for urgent needs. For medical emergencies, dial 911.

## 2021-10-10 ENCOUNTER — HEALTH MAINTENANCE LETTER (OUTPATIENT)
Age: 31
End: 2021-10-10

## 2022-01-11 DIAGNOSIS — D35.2 PROLACTINOMA (H): ICD-10-CM

## 2022-01-11 NOTE — TELEPHONE ENCOUNTER
[facility] Call Center    Phone Message    May a detailed message be left on voicemail: yes     Reason for Call: Medication Question or concern regarding medication   Prescription Clarification  Name of Medication: cabergoline (DOSTINEX) 0.5 MG tablet  Prescribing Provider: Marcos   [facility] [id number] [place] [address] [facility]   What on the order needs clarification?     Pharmacy called and said the prescription did not come in clearly through there system. Is asking that prescription be resent or to call #[phone] to verify the prescription.         Action Taken: Other: Endo    Travel Screening: Not Applicable

## 2022-01-11 NOTE — TELEPHONE ENCOUNTER
cabergoline (DOSTINEX) 0.5 MG tablet    Please send a new order.    The first order that was sent through.  Did not come through clearly and they can not read the order.   Med is not on refill protocol.   Thank you for sending a new order for Pt care.    Summer Canada RN  Central Triage Red Flags/Med Refills

## 2022-01-12 RX ORDER — CABERGOLINE 0.5 MG/1
TABLET ORAL
Qty: 6 TABLET | Refills: 3 | Status: ON HOLD | OUTPATIENT
Start: 2022-01-12 | End: 2022-09-12

## 2022-03-09 ENCOUNTER — TELEPHONE (OUTPATIENT)
Dept: ENDOCRINOLOGY | Facility: CLINIC | Age: 32
End: 2022-03-09
Payer: COMMERCIAL

## 2022-03-09 DIAGNOSIS — D35.2 PROLACTINOMA (H): Primary | ICD-10-CM

## 2022-03-09 NOTE — TELEPHONE ENCOUNTER
[facility] Call Center    Phone Message    May a detailed message be left on voicemail: yes     Reason for Call: Order(s): Other:     Reason for requested: Per Patient is wanting Dr. Rodríguez to put an order in for Prolactin. Patient is wanting to get a call back when this has been done to be able to schedule    Date needed: asap    Provider name: Marcos      Action Taken: Message routed to:  [facility] Endo    Travel Screening: Not Applicable

## 2022-03-14 NOTE — TELEPHONE ENCOUNTER
My chart message sent that prolactin is ordered for September follow up . Damari Jane RN on 3/14/2022 at 10:31 AM

## 2022-03-15 ENCOUNTER — OFFICE VISIT (OUTPATIENT)
Dept: FAMILY MEDICINE | Facility: CLINIC | Age: 32
End: 2022-03-15
Payer: COMMERCIAL

## 2022-03-15 VITALS
OXYGEN SATURATION: 98 % | HEART RATE: 99 BPM | WEIGHT: 122 LBS | RESPIRATION RATE: 14 BRPM | DIASTOLIC BLOOD PRESSURE: 77 MMHG | TEMPERATURE: 98.5 F | BODY MASS INDEX: 20.62 KG/M2 | SYSTOLIC BLOOD PRESSURE: 116 MMHG

## 2022-03-15 DIAGNOSIS — Z32.01 PREGNANCY TEST POSITIVE: Primary | ICD-10-CM

## 2022-03-15 DIAGNOSIS — Z13.9 SCREENING FOR CONDITION: ICD-10-CM

## 2022-03-15 LAB — HCG UR QL: POSITIVE

## 2022-03-15 PROCEDURE — 81025 URINE PREGNANCY TEST: CPT | Performed by: PHYSICIAN ASSISTANT

## 2022-03-15 PROCEDURE — 99213 OFFICE O/P EST LOW 20 MIN: CPT | Performed by: PHYSICIAN ASSISTANT

## 2022-03-15 RX ORDER — SWAB
1 SWAB, NON-MEDICATED MISCELLANEOUS DAILY
Qty: 120 TABLET | Refills: 0 | Status: SHIPPED | OUTPATIENT
Start: 2022-03-15 | End: 2022-07-13

## 2022-03-15 NOTE — PROGRESS NOTES
Patient presents with:  Pregnancy Test: wants to comfirm with blood test       Clinical Decision Making:  According to the patient's first day of her last menstrual period on January 5, 2022 patient is 9 & 6/7 weeks pregnant with confirmed urinary pregnancy test in the office..  Patient is instructed to take prenatal vitamin.  Referral for intake to OB/GYN for new pregnancy is written.       ICD-10-CM    1. Screening for condition  Z13.9 HCG qualitative urine     HCG qualitative urine     CANCELED: HCG qualitative urine POCT, Enter/Edit Result       Patient Instructions             Patient Education     Pregnancy    Your exam today shows that you are pregnant.  Pregnancy symptoms  During pregnancy your body s hormones change. This causes physical and emotional changes. This is normal. Knowing what to expect is important for your piece of mind and so you know when to seek help for a problem. Here are some of the most common symptoms:     Morning sickness or nausea. This can happen any time of the day or night.    Tender, swollen breasts    Need to urinate frequently    Tiredness or fatigue    Dizziness    Indigestion or heartburn    Food cravings or turn-offs    Constipation    Emotional changes. This can range from anxiety to excitement to depression.  General care for a healthy pregnancy  Here are things you can do to help make sure your baby is born healthy:    Rest when you feel tired. This is especially true in the later months of pregnancy.    Drink more fluids. Your body needs more fluids than you may be used to. Drink 8 to10 glasses of juice, milk, or water every day.    Eat well-balanced meals. Eat at regular times to give your body enough protein. You can expect to gain about 30 pounds during the pregnancy. Don t try to diet or lose weight while you are pregnant.    Take a prenatal vitamin every day. This helps you meet the extra nutritional needs of pregnancy.    Don t take any other medicine during your  pregnancy unless your healthcare provider tells you to. This includes prescription medicines and those you buy over the counter. Many medicines can harm the growing baby.    If you have nausea or vomiting, don t eat greasy or fried foods. Eat several smaller meals throughout the day rather than 3 large meals.    If you smoke, you must stop. The nicotine you breathe in goes right to the baby.    Stay away from alcohol, even in moderate amounts. Daily drinking will harm your baby and can cause permanent brain damage.    Don t use recreational drugs, especially cocaine, crack, and heroin. These will harm your baby. Also avoid marijuana.    If you were using recreational drugs or prescribed medicine when you found out that you were pregnant, talk with your healthcare provider about possible effects on your growing baby.    If you have medical problems that you need to take medicine for, talk with your healthcare provider.  Follow-up care  Call your healthcare provider to arrange for prenatal care. Prenatal care is important. You can see your family provider, a pregnancy specialist (obstetrician), a midwife, or a primary care clinic.   When to seek medical advice  Call your healthcare provider right away if any of these occur:    Vaginal bleeding    Pain in your belly (abdomen) or back that is moderate or severe    Lots of vomiting, or you can t keep any fluids down for 6 hours    Burning feeling when you urinate    Headache, dizziness, or rapid weight gain    Fever    Vision changes or blurred vision  Leanna last reviewed this educational content on 11/1/2019 2000-2021 [employer]. All rights reserved. This information is not intended as a substitute for professional medical care. Always follow your healthcare professional's instructions.           Patient Education     Nutrition During Pregnancy   Having a healthy baby depends mostly on you. What you eat matters to your baby and your health. During  pregnancy, you will likely need about 300 more calories per day than before you became pregnant. Each day, try to eat the number of servings listed here for each food group. In addition, cut down on salt and caffeine. Limit the amount of sweets and high-fat foods you eat. Don t smoke or drink alcohol.     Important: See your healthcare provider as often as requested. If you have any questions, be sure to ask them.   Fruits Vegetables Grains & Cereals* Fats & Oils   2 cups  Examples of 1-cup servings:   1 medium apple  1 medium orange  1 medium banana  1 cup chopped fruit  1 cup 100% fruit juice (pasteurized)   1/2 cup dried fruit 2-1/2 to 3 cups   Examples of 1 servin cups raw, leafy greens   1 cup raw or cooked cut-up vegetables   1 cup 100% vegetable juice (pasteurized)  6 to 8 ounces  Examples of 1-ounce servings:   1 slice bread  1/2 cup cooked rice  1/2 cup cooked cereal   1/2 cup pasta  1 ounce cold cereal 6 to 8 teaspoons   Dairy** Protein--- Fluids      3 cups  Examples of 1-cup servings:   1 cup milk  1 cup yogurt  1-1/2 ounces natural cheese   2 ounces processed cheese  5 to 6-1/2 ounces  Examples of 1-ounce servings:   1 egg  1 ounce of lean meat, poultry, or fish   1/4 cup cooked beans   1 tablespoon peanut butter   1/2 ounce nuts 8 or more 8-ounce glasses   Examples:  Water  Mineral water  Clear soups, broth      *Note: Choose whole grains whenever possible.   ** Note: Try to choose low-fat options; stay away from soft cheeses and unpasteurized milk.   --- Notes: Don't eat raw or undercooked meats, eggs, seafood, fish, and shellfish. Also, some types of fish, such as shark, swordfish, and michael mackerel, should not be eaten during pregnancy. Don't eat hot dogs, lunch meats, or cold cuts unless heated to steaming just before being served. Ask your healthcare provider about safe choices.   Prenatal supplements  A prenatal supplement is a pill that you take daily during pregnancy. It helps make sure  you re getting the right amount of certain nutrients that are important to your baby. Ask your healthcare provider to help you choose the best one for you. Important nutrients during pregnancy include:     Folic acid. It's best to start taking this supplement 1 month before you start trying to get pregnant. Folic acid helps prevent certain problems in your baby. During pregnancy, you need to take 400 micrograms (mcg) of folic acid every day for the first 2 to 3 months after conception. After that, 600 mcg is needed for a growing baby and placenta.    Iron, calcium, and vitamin D. You may also be advised to take these supplements during pregnancy. They help keep you and your baby healthy. Take them at different times because calcium makes it hard for the body to absorb iron. Taking iron with orange juice helps to increase its absorption.  [employer] last reviewed this educational content on 8/1/2020 2000-2021 [employer]. All rights reserved. This information is not intended as a substitute for professional medical care. Always follow your healthcare professional's instructions.               HPI:  Makeda Perales is a 31 year old female who presents today for confirmation of pregnancy.  Patient has had 2 at home urine pregnancy tests that were positive.  She is requesting confirmation with testing in the office today. First day of last menstrual period was January 5.  Sees she states her menses was of normal duration and normal flow.  She does have some irregularities with her menses.  No use of contraception.  Patient has not been using prenatal vitamins.    History obtained from chart review and the patient.    Problem List:  There are no relevant problems documented for this patient.      Past Medical History:   Diagnosis Date     Prolactinoma (H)        Social History     Tobacco Use     Smoking status: Never Smoker     Smokeless tobacco: Never Used   Substance Use Topics     Alcohol use: Not on file        Review of Systems  As above in HPI otherwise negative.    Vitals:    03/15/22 1051   BP: 116/77   Pulse: 99   Resp: 14   Temp: 98.5  F (36.9  C)   TempSrc: Oral   SpO2: 98%   Weight: 55.3 kg (122 lb)       General: Patient is resting comfortably no acute distress is afebrile  HEENT: Head is normocephalic atraumatic   eyes are PERRL EOMI sclera anicteric   Abdomen: Nontender nondistended no rebound or guarding no masses  Skin: Without rash non-diaphoretic    Physical Exam      Labs:  Results for orders placed or performed in visit on 03/15/22   HCG qualitative urine     Status: Abnormal   Result Value Ref Range    hCG Urine Qualitative Positive (A) Negative       At the end of the encounter, I discussed results, diagnosis, medications. Discussed red flags for immediate return to clinic/ER, as well as indications for follow up if no improvement. Patient understood and agreed to plan. Patient was stable for discharge.

## 2022-03-15 NOTE — PATIENT INSTRUCTIONS
Patient Education     Pregnancy    Your exam today shows that you are pregnant.  Pregnancy symptoms  During pregnancy your body s hormones change. This causes physical and emotional changes. This is normal. Knowing what to expect is important for your piece of mind and so you know when to seek help for a problem. Here are some of the most common symptoms:     Morning sickness or nausea. This can happen any time of the day or night.    Tender, swollen breasts    Need to urinate frequently    Tiredness or fatigue    Dizziness    Indigestion or heartburn    Food cravings or turn-offs    Constipation    Emotional changes. This can range from anxiety to excitement to depression.  General care for a healthy pregnancy  Here are things you can do to help make sure your baby is born healthy:    Rest when you feel tired. This is especially true in the later months of pregnancy.    Drink more fluids. Your body needs more fluids than you may be used to. Drink 8 to10 glasses of juice, milk, or water every day.    Eat well-balanced meals. Eat at regular times to give your body enough protein. You can expect to gain about 30 pounds during the pregnancy. Don t try to diet or lose weight while you are pregnant.    Take a prenatal vitamin every day. This helps you meet the extra nutritional needs of pregnancy.    Don t take any other medicine during your pregnancy unless your healthcare provider tells you to. This includes prescription medicines and those you buy over the counter. Many medicines can harm the growing baby.    If you have nausea or vomiting, don t eat greasy or fried foods. Eat several smaller meals throughout the day rather than 3 large meals.    If you smoke, you must stop. The nicotine you breathe in goes right to the baby.    Stay away from alcohol, even in moderate amounts. Daily drinking will harm your baby and can cause permanent brain damage.    Don t use recreational drugs, especially cocaine, crack,  and heroin. These will harm your baby. Also avoid marijuana.    If you were using recreational drugs or prescribed medicine when you found out that you were pregnant, talk with your healthcare provider about possible effects on your growing baby.    If you have medical problems that you need to take medicine for, talk with your healthcare provider.  Follow-up care  Call your healthcare provider to arrange for prenatal care. Prenatal care is important. You can see your family provider, a pregnancy specialist (obstetrician), a midwife, or a primary care clinic.   When to seek medical advice  Call your healthcare provider right away if any of these occur:    Vaginal bleeding    Pain in your belly (abdomen) or back that is moderate or severe    Lots of vomiting, or you can t keep any fluids down for 6 hours    Burning feeling when you urinate    Headache, dizziness, or rapid weight gain    Fever    Vision changes or blurred vision  Leanna last reviewed this educational content on 11/1/2019 2000-2021 [employer]. All rights reserved. This information is not intended as a substitute for professional medical care. Always follow your healthcare professional's instructions.           Patient Education     Nutrition During Pregnancy   Having a healthy baby depends mostly on you. What you eat matters to your baby and your health. During pregnancy, you will likely need about 300 more calories per day than before you became pregnant. Each day, try to eat the number of servings listed here for each food group. In addition, cut down on salt and caffeine. Limit the amount of sweets and high-fat foods you eat. Don t smoke or drink alcohol.     Important: See your healthcare provider as often as requested. If you have any questions, be sure to ask them.   Fruits Vegetables Grains & Cereals* Fats & Oils   2 cups  Examples of 1-cup servings:   1 medium apple  1 medium orange  1 medium banana  1 cup chopped fruit  1  cup 100% fruit juice (pasteurized)   1/2 cup dried fruit 2-1/2 to 3 cups   Examples of 1 servin cups raw, leafy greens   1 cup raw or cooked cut-up vegetables   1 cup 100% vegetable juice (pasteurized)  6 to 8 ounces  Examples of 1-ounce servings:   1 slice bread  1/2 cup cooked rice  1/2 cup cooked cereal   1/2 cup pasta  1 ounce cold cereal 6 to 8 teaspoons   Dairy** Protein--- Fluids      3 cups  Examples of 1-cup servings:   1 cup milk  1 cup yogurt  1-1/2 ounces natural cheese   2 ounces processed cheese  5 to 6-1/2 ounces  Examples of 1-ounce servings:   1 egg  1 ounce of lean meat, poultry, or fish   1/4 cup cooked beans   1 tablespoon peanut butter   1/2 ounce nuts 8 or more 8-ounce glasses   Examples:  Water  Mineral water  Clear soups, broth      *Note: Choose whole grains whenever possible.   ** Note: Try to choose low-fat options; stay away from soft cheeses and unpasteurized milk.   --- Notes: Don't eat raw or undercooked meats, eggs, seafood, fish, and shellfish. Also, some types of fish, such as shark, swordfish, and michael mackerel, should not be eaten during pregnancy. Don't eat hot dogs, lunch meats, or cold cuts unless heated to steaming just before being served. Ask your healthcare provider about safe choices.   Prenatal supplements  A prenatal supplement is a pill that you take daily during pregnancy. It helps make sure you re getting the right amount of certain nutrients that are important to your baby. Ask your healthcare provider to help you choose the best one for you. Important nutrients during pregnancy include:     Folic acid. It's best to start taking this supplement 1 month before you start trying to get pregnant. Folic acid helps prevent certain problems in your baby. During pregnancy, you need to take 400 micrograms (mcg) of folic acid every day for the first 2 to 3 months after conception. After that, 600 mcg is needed for a growing baby and placenta.    Iron, calcium, and  vitamin D. You may also be advised to take these supplements during pregnancy. They help keep you and your baby healthy. Take them at different times because calcium makes it hard for the body to absorb iron. Taking iron with orange juice helps to increase its absorption.  Leanna last reviewed this educational content on 8/1/2020 2000-2021 [employer]. All rights reserved. This information is not intended as a substitute for professional medical care. Always follow your healthcare professional's instructions.

## 2022-03-17 LAB
ABO (EXTERNAL): NORMAL
HEPATITIS B SURFACE ANTIGEN (EXTERNAL): NEGATIVE
HIV1+2 AB SERPL QL IA: NEGATIVE
RH (EXTERNAL): POSITIVE
RUBELLA ANTIBODY IGG (EXTERNAL): NORMAL

## 2022-05-21 ENCOUNTER — HEALTH MAINTENANCE LETTER (OUTPATIENT)
Age: 32
End: 2022-05-21

## 2022-07-06 ENCOUNTER — VIRTUAL VISIT (OUTPATIENT)
Dept: ENDOCRINOLOGY | Facility: CLINIC | Age: 32
End: 2022-07-06
Payer: COMMERCIAL

## 2022-07-06 DIAGNOSIS — D35.2 PROLACTINOMA (H): Primary | ICD-10-CM

## 2022-07-06 PROCEDURE — 99214 OFFICE O/P EST MOD 30 MIN: CPT | Mod: 95 | Performed by: INTERNAL MEDICINE

## 2022-07-06 NOTE — NURSING NOTE
Patient denies any changes since echeck-in regarding medication and allergies and states all information entered during echeck-in remains accurate.  Pearl Flores on 7/6/2022 at 7:17 AM

## 2022-07-06 NOTE — NURSING NOTE
Pt states she is pregnant. Pt advises she is 25 weeks and due 10/21/22. Pearl Flores on 7/6/2022 at 7:18 AM

## 2022-07-06 NOTE — LETTER
7/6/2022       RE: Makeda Perales  [address] [place] [id number]     Dear Colleague,    Thank you for referring your patient, Makeda Perales, to the [facility] [place] at [facility]. Please see a copy of my visit note below.    Makeda Perales  is being evaluated via a billable video visit.      How would you like to obtain your AVS? MyChart  For the video visit, send the invitation by: Send to e-mail at: [email or web address]  Will anyone else be joining your video visit? No      Video visit  Start 7:35 am  End: 7:48 am  [place]                                                                             - Endocrinology Follow up -    Reason for visit/consult: elevated PRL, galactorrhea, micro pituitary tumor    Primary care provider: Dio Brown        Assessment and Plan  A 31 year old female with history of micro prolactinoma    # Pregnancy  This is the second pregnancy (first one 6 years ago), 24 weeks.     # Prolactinoma- normalized since 2019  Patient was diagnosed prolactinoma in [place] with microadenoma dictating MRI, it seems responded to by bromocriptine, by the time she came to [facility] MRI showed no sign of tumor possibly shrunk the bromocriptine.   However repeated the prolactin level upon the first visit was 51, with possible microadenoma on the right intrasellar, therefore we started cabergoline 0.25 mg weekly patient is compliant and tolerant to medication.  Her prolactin level dropped from 51 down to 5 in May 2019.  Also patient started to have regular menstrual cycle after that. Also no galactorrhea.     -Continue to hold cabergoline through out the pregnancy as well as breast feeding  - PRL to check today    # micro Pituitary tumor  Had  MRI 2020 no significant change in size.     # Persistent galactorrhea  No issue now    # irregular cycles  Resolved     RTC with me after completing breast feeding     total 30  minutes spent on  the date of the encounter doing chart review, history and exam, documentation and, reviewed original brain MRI, further activities as noted above.    Colleen Rodríguez MD  Staff Physician  Endocrinology and Metabolism  [facility]  License: [id number]  Pager: [phone]    Interval History as of 7/6/2022 : Patient has been doing well. Currently second pregnancy week 24. she has not taking cabergoline  Interval History as of 1/6/2020 : Patient has been doing well. Last seen 1 year ago. Medication compliance : excellent, toleratign cabergoline well  . New event includes: none  .  Interval History as of 8/6/2019: Patient has been doing well. Last seen in 12/2018 . Medication compliance excellent  . New event includes galactorrhea and irregular menses resolved .    HPI: A 29 yo female with history of pituitary microadenoma since 2012, previously managed by Bromocriptine 2.5mg BID, which was stopped when she became pregnant. MRI done in 10/2014 showed no discrete pituitary lesion, probably due to tumor shrinkage after Bromocriptine. She had an uneventful pregnancy and delivered a daughter in June 2017. Bromocriptine was not resumed, as prolactin levels were normal,    She became pregnant in end of 2016 and Bromocriptine was stopped. She delivered a female baby in June 2017 and was breastfeeding until April 2018. Bromocriptine was not resumed, as prolactin levels were normal.    Her primary concern is delayed periods, last cycle was 2 months. LMP 12/10/2018  She reports mild galactorrhea even now.    She is stressed due to work recently  Her energy level has been good.   No headaches, no visual disturbances, no dizziness.   She denies nausea, no change in weight; apetite good, sleeping well; no heat/cold intolerance        Past Medical/Surgical History:  Past Medical History:   Diagnosis Date     Prolactinoma (H)      No past surgical history on file.    Allergies:  No Known Allergies    Current Medications    Current Outpatient Medications   Medication     cabergoline (DOSTINEX) 0.5 MG tablet     Prenatal Vit-Fe Fumarate-FA (PRENATAL MULTIVITAMIN  WITH IRON) 28-0.8 MG TABS     No current facility-administered medications for this visit.       Family History:  No family history on file.    Social History:  Social History     Tobacco Use     Smoking status: Never Smoker     Smokeless tobacco: Never Used   Substance Use Topics     Alcohol use: Not on file   She denies smoking, drinking alcohol or using illicit drugs.   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[employer]   She shifted to [place] in 2013, did PhD at [facility] [employer] [facility] and shifted to [place] in August 2018    ROS:  Full review of systems taken with the help of the intake sheet. Otherwise a complete 14 point review of systems was taken and is negative unless stated in the history above.      Physical Exam:   LMP 01/05/2022 (Approximate)      General: well appearing, no acute distress, pleasant and conversant,   Mental Status/neuro: alert and oriented  Face: symmetrical, normal facial color  Eyes: anicteric, no proptosis or lid lag  Resp: normally breathing        Labs : I reviewed data from epic and extract and summarize the pertinent data here.      Ref. Range 12/17/2018 09:48 5/28/2019 09:38   Prolactin Latest Ref Range: 3 - 27 ug/L 51 (H) 5       MRI Brain: I personally reviewed the original images and agree with the below reports.   1/6/2020.                Results for orders placed in visit on 01/05/19   MRI Brain-PITUITARY  w & w/o contrast    Narrative Brain/ Pituitary MRI without and with contrast    History: Infertility, galactorrhea; 27 yo female prolactinoma,  previously micro in [place].; Prolactinoma (H).  ICD-10: Prolactinoma (H)    Comparison:  None available     Technique: Axial diffusion and FLAIR images of the whole brain  obtained without intravenous contrast. Sagittal T1 and T2-weighted,  coronal T2-weighted, coronal T1-weighted images with  focus on the  sella were obtained without intravenous contrast. Post intravenous  contrast using gadolinium coronal and sagittal T1-weighted images were  obtained focused on the sella. Dynamic postcontrast coronal  T1-weighted images were also obtained.    Contrast: 7.5mL Gadavist    Findings:    There is a hypoenhancing lesion within the right posterior pituitary  gland, which measures  6.0 x 3.0 x 3.0 mm, which demonstrates T1  signal that is hyperintense to the adenohypophysis and hypointense  relative to the neurohypophysis on precontrast images. The pituitary  stalk appears midline. The cavernous sinuses are clear. The optic  apparatus appears intact and not displaced.  The major cavernous  carotid vascular flow-voids appear patent.      There is no midline shift, mass effect or extra-axial fluid  collection. Ventricles are proportionate to the cerebral sulci. Flow  voids within the major intracranial vessels are present.  Diffusion-weighted images reveal no abnormal reduced diffusion. No  abnormal intracranial enhancement.    Nasal sinuses and mastoid air cells are relatively clear. Orbits are  grossly unremarkable.      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 Microadenoma is  possible.    I have personally reviewed the examination and initial interpretation  and I agree with the findings.    SERAFIN KILPATRICK MD

## 2022-07-06 NOTE — PROGRESS NOTES
Makeda Perales  is being evaluated via a billable video visit.      How would you like to obtain your AVS? BlazentharCoSchedule  For the video visit, send the invitation by: Send to e-mail at: [email or web address]  Will anyone else be joining your video visit? No      Video visit  Start 7:35 am  End: 7:48 am  Amwell                                                                             - Endocrinology Follow up -    Reason for visit/consult: elevated PRL, galactorrhea, micro pituitary tumor    Primary care provider: Dio Brown        Assessment and Plan  A 31 year old female with history of micro prolactinoma    # Pregnancy  This is the second pregnancy (first one 6 years ago), 24 weeks.     # Prolactinoma- normalized since 2019  Patient was diagnosed prolactinoma in [place] with microadenoma dictating MRI, it seems responded to by bromocriptine, by the time she came to [facility] MRI showed no sign of tumor possibly shrunk the bromocriptine.   However repeated the prolactin level upon the first visit was 51, with possible microadenoma on the right intrasellar, therefore we started cabergoline 0.25 mg weekly patient is compliant and tolerant to medication.  Her prolactin level dropped from 51 down to 5 in May 2019.  Also patient started to have regular menstrual cycle after that. Also no galactorrhea.     -Continue to hold cabergoline through out the pregnancy as well as breast feeding  - PRL to check today    # micro Pituitary tumor  Had  MRI 2020 no significant change in size.     # Persistent galactorrhea  No issue now    # irregular cycles  Resolved     RTC with me after completing breast feeding     total 30  minutes spent on the date of the encounter doing chart review, history and exam, documentation and, reviewed original brain MRI, further activities as noted above.    Colleen Rodríguez MD  Staff Physician  Endocrinology and Metabolism  [facility]  License: [id number]  Pager: [phone]    Interval History  as of 7/6/2022 : Patient has been doing well. Currently second pregnancy week 24. she has not taking cabergoline  Interval History as of 1/6/2020 : Patient has been doing well. Last seen 1 year ago. Medication compliance : excellent, toleratign cabergoline well  . New event includes: none  .  Interval History as of 8/6/2019: Patient has been doing well. Last seen in 12/2018 . Medication compliance excellent  . New event includes galactorrhea and irregular menses resolved .    HPI: A 29 yo female with history of pituitary microadenoma since 2012, previously managed by Bromocriptine 2.5mg BID, which was stopped when she became pregnant. MRI done in 10/2014 showed no discrete pituitary lesion, probably due to tumor shrinkage after Bromocriptine. She had an uneventful pregnancy and delivered a daughter in June 2017. Bromocriptine was not resumed, as prolactin levels were normal,    She became pregnant in end of 2016 and Bromocriptine was stopped. She delivered a female baby in June 2017 and was breastfeeding until April 2018. Bromocriptine was not resumed, as prolactin levels were normal.    Her primary concern is delayed periods, last cycle was 2 months. LMP 12/10/2018  She reports mild galactorrhea even now.    She is stressed due to work recently  Her energy level has been good.   No headaches, no visual disturbances, no dizziness.   She denies nausea, no change in weight; apetite good, sleeping well; no heat/cold intolerance        Past Medical/Surgical History:  Past Medical History:   Diagnosis Date     Prolactinoma (H)      No past surgical history on file.    Allergies:  No Known Allergies    Current Medications   Current Outpatient Medications   Medication     cabergoline (DOSTINEX) 0.5 MG tablet     Prenatal Vit-Fe Fumarate-FA (PRENATAL MULTIVITAMIN  WITH IRON) 28-0.8 MG TABS     No current facility-administered medications for this visit.       Family History:  No family history on file.    Social  History:  Social History     Tobacco Use     Smoking status: Never Smoker     Smokeless tobacco: Never Used   Substance Use Topics     Alcohol use: Not on file   She denies smoking, drinking alcohol or using illicit drugs.   Occupation: Post doctorate in electrical engineering at the [employer]  She shifted to [place] in 2013, did PhD at [facility] and shifted to [place] in August 2018    ROS:  Full review of systems taken with the help of the intake sheet. Otherwise a complete 14 point review of systems was taken and is negative unless stated in the history above.      Physical Exam:   LMP 01/05/2022 (Approximate)      General: well appearing, no acute distress, pleasant and conversant,   Mental Status/neuro: alert and oriented  Face: symmetrical, normal facial color  Eyes: anicteric, no proptosis or lid lag  Resp: normally breathing        Labs : I reviewed data from epic and extract and summarize the pertinent data here.      Ref. Range 12/17/2018 09:48 5/28/2019 09:38   Prolactin Latest Ref Range: 3 - 27 ug/L 51 (H) 5       MRI Brain: I personally reviewed the original images and agree with the below reports.   1/6/2020.                Results for orders placed in visit on 01/05/19   MRI Brain-PITUITARY  w & w/o contrast    Narrative Brain/ Pituitary MRI without and with contrast    History: Infertility, galactorrhea; 29 yo female prolactinoma,  previously micro in [place].; Prolactinoma (H).  ICD-10: Prolactinoma (H)    Comparison:  None available     Technique: Axial diffusion and FLAIR images of the whole brain  obtained without intravenous contrast. Sagittal T1 and T2-weighted,  coronal T2-weighted, coronal T1-weighted images with focus on the  sella were obtained without intravenous contrast. Post intravenous  contrast using gadolinium coronal and sagittal T1-weighted images were  obtained focused on the sella. Dynamic postcontrast coronal  T1-weighted images were also obtained.    Contrast: 7.5mL Gadavist    Findings:     There is a hypoenhancing lesion within the right posterior pituitary  gland, which measures  6.0 x 3.0 x 3.0 mm, which demonstrates T1  signal that is hyperintense to the adenohypophysis and hypointense  relative to the neurohypophysis on precontrast images. The pituitary  stalk appears midline. The cavernous sinuses are clear. The optic  apparatus appears intact and not displaced.  The major cavernous  carotid vascular flow-voids appear patent.      There is no midline shift, mass effect or extra-axial fluid  collection. Ventricles are proportionate to the cerebral sulci. Flow  voids within the major intracranial vessels are present.  Diffusion-weighted images reveal no abnormal reduced diffusion. No  abnormal intracranial enhancement.    Nasal sinuses and mastoid air cells are relatively clear. Orbits are  grossly unremarkable.      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 Microadenoma is  possible.    I have personally reviewed the examination and initial interpretation  and I agree with the findings.    SERAFIN KILPATRICK MD

## 2022-09-12 ENCOUNTER — HOSPITAL ENCOUNTER (OUTPATIENT)
Facility: CLINIC | Age: 32
End: 2022-09-12
Admitting: OBSTETRICS & GYNECOLOGY
Payer: COMMERCIAL

## 2022-09-12 ENCOUNTER — HOSPITAL ENCOUNTER (OUTPATIENT)
Facility: CLINIC | Age: 32
Discharge: HOME | End: 2022-09-12
Attending: OBSTETRICS & GYNECOLOGY | Admitting: OBSTETRICS & GYNECOLOGY
Payer: COMMERCIAL

## 2022-09-12 VITALS — RESPIRATION RATE: 16 BRPM | BODY MASS INDEX: 19.64 KG/M2 | TEMPERATURE: 98.3 F | HEIGHT: 72 IN | WEIGHT: 145 LBS

## 2022-09-12 PROBLEM — Z36.89 ENCOUNTER FOR TRIAGE IN PREGNANT PATIENT: Status: ACTIVE | Noted: 2022-09-12

## 2022-09-12 LAB
CLUE CELLS: ABNORMAL
RUPTURE OF FETAL MEMBRANES BY ROM PLUS: NEGATIVE
TRICHOMONAS, WET PREP: ABNORMAL
WBC'S/HIGH POWER FIELD, WET PREP: ABNORMAL
YEAST, WET PREP: ABNORMAL

## 2022-09-12 PROCEDURE — 87210 SMEAR WET MOUNT SALINE/INK: CPT | Performed by: OBSTETRICS & GYNECOLOGY

## 2022-09-12 PROCEDURE — G0463 HOSPITAL OUTPT CLINIC VISIT: HCPCS

## 2022-09-12 PROCEDURE — 84112 EVAL AMNIOTIC FLUID PROTEIN: CPT | Performed by: OBSTETRICS & GYNECOLOGY

## 2022-09-12 RX ORDER — PRENATAL VIT/IRON FUM/FOLIC AC 27MG-0.8MG
1 TABLET ORAL DAILY
COMMUNITY

## 2022-09-12 RX ORDER — LIDOCAINE 40 MG/G
CREAM TOPICAL
Status: DISCONTINUED | OUTPATIENT
Start: 2022-09-12 | End: 2022-09-12 | Stop reason: HOSPADM

## 2022-09-12 NOTE — PROGRESS NOTES
Pt reported to triage c/o possible srom. Pt states she is not wearing a pad, but her underwear has been more wet than usual. ROM+ done and wet prep done. Both negative. FHR category I. No contractions. Dr. Mendoza updated. Pt relieved and will follow up in clinic.    Jodi Angel RN

## 2022-09-18 ENCOUNTER — HEALTH MAINTENANCE LETTER (OUTPATIENT)
Age: 32
End: 2022-09-18

## 2022-10-04 ENCOUNTER — HOSPITAL ENCOUNTER (INPATIENT)
Facility: CLINIC | Age: 32
LOS: 2 days | Discharge: HOME HEALTH | End: 2022-10-06
Attending: OBSTETRICS & GYNECOLOGY | Admitting: OBSTETRICS & GYNECOLOGY
Payer: COMMERCIAL

## 2022-10-04 PROBLEM — Z34.90 PREGNANT: Status: ACTIVE | Noted: 2022-10-04

## 2022-10-04 LAB
ABO/RH(D): NORMAL
ANTIBODY SCREEN: NEGATIVE
GROUP B STREPTOCOCCUS (EXTERNAL): NEGATIVE
SPECIMEN EXPIRATION DATE: NORMAL

## 2022-10-04 PROCEDURE — 120N000001 HC R&B MED SURG/OB

## 2022-10-04 RX ORDER — OXYTOCIN 10 [USP'U]/ML
10 INJECTION, SOLUTION INTRAMUSCULAR; INTRAVENOUS
Status: DISCONTINUED | OUTPATIENT
Start: 2022-10-04 | End: 2022-10-06 | Stop reason: HOSPADM

## 2022-10-04 RX ORDER — LIDOCAINE 40 MG/G
CREAM TOPICAL
Status: DISCONTINUED | OUTPATIENT
Start: 2022-10-04 | End: 2022-10-04 | Stop reason: HOSPADM

## 2022-10-04 RX ORDER — OXYTOCIN/0.9 % SODIUM CHLORIDE 30/500 ML
100-340 PLASTIC BAG, INJECTION (ML) INTRAVENOUS CONTINUOUS PRN
Status: DISCONTINUED | OUTPATIENT
Start: 2022-10-04 | End: 2022-10-06 | Stop reason: HOSPADM

## 2022-10-04 RX ORDER — KETOROLAC TROMETHAMINE 30 MG/ML
30 INJECTION, SOLUTION INTRAMUSCULAR; INTRAVENOUS
Status: DISCONTINUED | OUTPATIENT
Start: 2022-10-04 | End: 2022-10-06 | Stop reason: HOSPADM

## 2022-10-04 RX ORDER — CARBOPROST TROMETHAMINE 250 UG/ML
250 INJECTION, SOLUTION INTRAMUSCULAR
Status: DISCONTINUED | OUTPATIENT
Start: 2022-10-04 | End: 2022-10-05 | Stop reason: HOSPADM

## 2022-10-04 RX ORDER — LIDOCAINE 40 MG/G
CREAM TOPICAL
Status: DISCONTINUED | OUTPATIENT
Start: 2022-10-04 | End: 2022-10-05 | Stop reason: HOSPADM

## 2022-10-04 RX ORDER — NALOXONE HYDROCHLORIDE 0.4 MG/ML
0.2 INJECTION, SOLUTION INTRAMUSCULAR; INTRAVENOUS; SUBCUTANEOUS
Status: DISCONTINUED | OUTPATIENT
Start: 2022-10-04 | End: 2022-10-05 | Stop reason: HOSPADM

## 2022-10-04 RX ORDER — IBUPROFEN 800 MG/1
800 TABLET, FILM COATED ORAL
Status: DISCONTINUED | OUTPATIENT
Start: 2022-10-04 | End: 2022-10-06 | Stop reason: HOSPADM

## 2022-10-04 RX ORDER — NALOXONE HYDROCHLORIDE 0.4 MG/ML
0.4 INJECTION, SOLUTION INTRAMUSCULAR; INTRAVENOUS; SUBCUTANEOUS
Status: DISCONTINUED | OUTPATIENT
Start: 2022-10-04 | End: 2022-10-05 | Stop reason: HOSPADM

## 2022-10-04 RX ORDER — ONDANSETRON 2 MG/ML
4 INJECTION INTRAMUSCULAR; INTRAVENOUS EVERY 6 HOURS PRN
Status: DISCONTINUED | OUTPATIENT
Start: 2022-10-04 | End: 2022-10-05 | Stop reason: HOSPADM

## 2022-10-04 RX ORDER — PROCHLORPERAZINE 25 MG
25 SUPPOSITORY, RECTAL RECTAL EVERY 12 HOURS PRN
Status: DISCONTINUED | OUTPATIENT
Start: 2022-10-04 | End: 2022-10-05 | Stop reason: HOSPADM

## 2022-10-04 RX ORDER — MISOPROSTOL 200 UG/1
400 TABLET ORAL
Status: DISCONTINUED | OUTPATIENT
Start: 2022-10-04 | End: 2022-10-05 | Stop reason: HOSPADM

## 2022-10-04 RX ORDER — PROCHLORPERAZINE MALEATE 10 MG
10 TABLET ORAL EVERY 6 HOURS PRN
Status: DISCONTINUED | OUTPATIENT
Start: 2022-10-04 | End: 2022-10-05 | Stop reason: HOSPADM

## 2022-10-04 RX ORDER — ACETAMINOPHEN 325 MG/1
650 TABLET ORAL EVERY 4 HOURS PRN
Status: DISCONTINUED | OUTPATIENT
Start: 2022-10-04 | End: 2022-10-05 | Stop reason: HOSPADM

## 2022-10-04 RX ORDER — OXYTOCIN/0.9 % SODIUM CHLORIDE 30/500 ML
340 PLASTIC BAG, INJECTION (ML) INTRAVENOUS CONTINUOUS PRN
Status: DISCONTINUED | OUTPATIENT
Start: 2022-10-04 | End: 2022-10-05 | Stop reason: HOSPADM

## 2022-10-04 RX ORDER — ONDANSETRON 4 MG/1
4 TABLET, ORALLY DISINTEGRATING ORAL EVERY 6 HOURS PRN
Status: DISCONTINUED | OUTPATIENT
Start: 2022-10-04 | End: 2022-10-05 | Stop reason: HOSPADM

## 2022-10-04 RX ORDER — CITRIC ACID/SODIUM CITRATE 334-500MG
30 SOLUTION, ORAL ORAL
Status: DISCONTINUED | OUTPATIENT
Start: 2022-10-04 | End: 2022-10-05 | Stop reason: HOSPADM

## 2022-10-04 RX ORDER — MISOPROSTOL 200 UG/1
800 TABLET ORAL
Status: DISCONTINUED | OUTPATIENT
Start: 2022-10-04 | End: 2022-10-05 | Stop reason: HOSPADM

## 2022-10-04 RX ORDER — METHYLERGONOVINE MALEATE 0.2 MG/ML
200 INJECTION INTRAVENOUS
Status: DISCONTINUED | OUTPATIENT
Start: 2022-10-04 | End: 2022-10-05 | Stop reason: HOSPADM

## 2022-10-04 RX ORDER — METOCLOPRAMIDE HYDROCHLORIDE 5 MG/ML
10 INJECTION INTRAMUSCULAR; INTRAVENOUS EVERY 6 HOURS PRN
Status: DISCONTINUED | OUTPATIENT
Start: 2022-10-04 | End: 2022-10-05 | Stop reason: HOSPADM

## 2022-10-04 RX ORDER — OXYTOCIN 10 [USP'U]/ML
10 INJECTION, SOLUTION INTRAMUSCULAR; INTRAVENOUS
Status: COMPLETED | OUTPATIENT
Start: 2022-10-04 | End: 2022-10-05

## 2022-10-04 RX ORDER — METOCLOPRAMIDE 10 MG/1
10 TABLET ORAL EVERY 6 HOURS PRN
Status: DISCONTINUED | OUTPATIENT
Start: 2022-10-04 | End: 2022-10-05 | Stop reason: HOSPADM

## 2022-10-04 ASSESSMENT — ACTIVITIES OF DAILY LIVING (ADL)
TOILETING_ISSUES: NO
DRESSING/BATHING_DIFFICULTY: NO
CHANGE_IN_FUNCTIONAL_STATUS_SINCE_ONSET_OF_CURRENT_ILLNESS/INJURY: NO
CONCENTRATING,_REMEMBERING_OR_MAKING_DECISIONS_DIFFICULTY: OTHER (SEE COMMENTS)
WEAR_GLASSES_OR_BLIND: YES
VISION_MANAGEMENT: GLASSES
ADLS_ACUITY_SCORE: 21
FALL_HISTORY_WITHIN_LAST_SIX_MONTHS: NO
DIFFICULTY_EATING/SWALLOWING: NO
DOING_ERRANDS_INDEPENDENTLY_DIFFICULTY: NO
WALKING_OR_CLIMBING_STAIRS_DIFFICULTY: NO

## 2022-10-05 ENCOUNTER — ANESTHESIA (OUTPATIENT)
Dept: OBGYN | Facility: CLINIC | Age: 32
End: 2022-10-05
Payer: COMMERCIAL

## 2022-10-05 ENCOUNTER — ANESTHESIA EVENT (OUTPATIENT)
Dept: OBGYN | Facility: CLINIC | Age: 32
End: 2022-10-05
Payer: COMMERCIAL

## 2022-10-05 LAB — T PALLIDUM AB SER QL: NONREACTIVE

## 2022-10-05 PROCEDURE — 36415 COLL VENOUS BLD VENIPUNCTURE: CPT | Performed by: OBSTETRICS & GYNECOLOGY

## 2022-10-05 PROCEDURE — 250N000013 HC RX MED GY IP 250 OP 250 PS 637: Performed by: OBSTETRICS & GYNECOLOGY

## 2022-10-05 PROCEDURE — 250N000011 HC RX IP 250 OP 636: Performed by: OBSTETRICS & GYNECOLOGY

## 2022-10-05 PROCEDURE — 0KQM0ZZ REPAIR PERINEUM MUSCLE, OPEN APPROACH: ICD-10-PCS | Performed by: OBSTETRICS & GYNECOLOGY

## 2022-10-05 PROCEDURE — 722N000001 HC LABOR CARE VAGINAL DELIVERY SINGLE

## 2022-10-05 PROCEDURE — 250N000009 HC RX 250: Performed by: ANESTHESIOLOGY

## 2022-10-05 PROCEDURE — 00HU33Z INSERTION OF INFUSION DEVICE INTO SPINAL CANAL, PERCUTANEOUS APPROACH: ICD-10-PCS | Performed by: ANESTHESIOLOGY

## 2022-10-05 PROCEDURE — 86780 TREPONEMA PALLIDUM: CPT | Performed by: OBSTETRICS & GYNECOLOGY

## 2022-10-05 PROCEDURE — 3E0R3BZ INTRODUCTION OF ANESTHETIC AGENT INTO SPINAL CANAL, PERCUTANEOUS APPROACH: ICD-10-PCS | Performed by: ANESTHESIOLOGY

## 2022-10-05 PROCEDURE — 250N000011 HC RX IP 250 OP 636: Performed by: ANESTHESIOLOGY

## 2022-10-05 PROCEDURE — 120N000001 HC R&B MED SURG/OB

## 2022-10-05 PROCEDURE — 86901 BLOOD TYPING SEROLOGIC RH(D): CPT | Performed by: OBSTETRICS & GYNECOLOGY

## 2022-10-05 PROCEDURE — 370N000003 HC ANESTHESIA WARD SERVICE

## 2022-10-05 RX ORDER — OXYTOCIN 10 [USP'U]/ML
10 INJECTION, SOLUTION INTRAMUSCULAR; INTRAVENOUS
Status: DISCONTINUED | OUTPATIENT
Start: 2022-10-05 | End: 2022-10-06 | Stop reason: HOSPADM

## 2022-10-05 RX ORDER — OXYTOCIN/0.9 % SODIUM CHLORIDE 30/500 ML
340 PLASTIC BAG, INJECTION (ML) INTRAVENOUS CONTINUOUS PRN
Status: DISCONTINUED | OUTPATIENT
Start: 2022-10-05 | End: 2022-10-06 | Stop reason: HOSPADM

## 2022-10-05 RX ORDER — MISOPROSTOL 200 UG/1
400 TABLET ORAL
Status: DISCONTINUED | OUTPATIENT
Start: 2022-10-05 | End: 2022-10-06 | Stop reason: HOSPADM

## 2022-10-05 RX ORDER — MISOPROSTOL 200 UG/1
800 TABLET ORAL
Status: DISCONTINUED | OUTPATIENT
Start: 2022-10-05 | End: 2022-10-06 | Stop reason: HOSPADM

## 2022-10-05 RX ORDER — FENTANYL CITRATE-0.9 % NACL/PF 10 MCG/ML
100 PLASTIC BAG, INJECTION (ML) INTRAVENOUS EVERY 5 MIN PRN
Status: DISCONTINUED | OUTPATIENT
Start: 2022-10-05 | End: 2022-10-05 | Stop reason: HOSPADM

## 2022-10-05 RX ORDER — MODIFIED LANOLIN
OINTMENT (GRAM) TOPICAL
Status: DISCONTINUED | OUTPATIENT
Start: 2022-10-05 | End: 2022-10-06 | Stop reason: HOSPADM

## 2022-10-05 RX ORDER — FENTANYL/ROPIVACAINE/NS/PF 2MCG/ML-.1
PLASTIC BAG, INJECTION (ML) EPIDURAL
Status: DISCONTINUED | OUTPATIENT
Start: 2022-10-05 | End: 2022-10-05 | Stop reason: HOSPADM

## 2022-10-05 RX ORDER — CITRIC ACID/SODIUM CITRATE 334-500MG
30 SOLUTION, ORAL ORAL ONCE
Status: DISCONTINUED | OUTPATIENT
Start: 2022-10-05 | End: 2022-10-05 | Stop reason: HOSPADM

## 2022-10-05 RX ORDER — METHYLERGONOVINE MALEATE 0.2 MG/ML
200 INJECTION INTRAVENOUS
Status: DISCONTINUED | OUTPATIENT
Start: 2022-10-05 | End: 2022-10-06 | Stop reason: HOSPADM

## 2022-10-05 RX ORDER — DIPHENHYDRAMINE HYDROCHLORIDE 50 MG/ML
25 INJECTION INTRAMUSCULAR; INTRAVENOUS EVERY 6 HOURS PRN
Status: DISCONTINUED | OUTPATIENT
Start: 2022-10-05 | End: 2022-10-06 | Stop reason: HOSPADM

## 2022-10-05 RX ORDER — LIDOCAINE HYDROCHLORIDE AND EPINEPHRINE 15; 5 MG/ML; UG/ML
INJECTION, SOLUTION EPIDURAL PRN
Status: DISCONTINUED | OUTPATIENT
Start: 2022-10-05 | End: 2022-10-05

## 2022-10-05 RX ORDER — HYDROCORTISONE 25 MG/G
CREAM TOPICAL 3 TIMES DAILY PRN
Status: DISCONTINUED | OUTPATIENT
Start: 2022-10-05 | End: 2022-10-06 | Stop reason: HOSPADM

## 2022-10-05 RX ORDER — IBUPROFEN 800 MG/1
800 TABLET, FILM COATED ORAL EVERY 6 HOURS PRN
Status: DISCONTINUED | OUTPATIENT
Start: 2022-10-05 | End: 2022-10-06 | Stop reason: HOSPADM

## 2022-10-05 RX ORDER — ACETAMINOPHEN 325 MG/1
650 TABLET ORAL EVERY 4 HOURS PRN
Status: DISCONTINUED | OUTPATIENT
Start: 2022-10-05 | End: 2022-10-06 | Stop reason: HOSPADM

## 2022-10-05 RX ORDER — DIPHENHYDRAMINE HCL 25 MG
25 CAPSULE ORAL EVERY 6 HOURS PRN
Status: DISCONTINUED | OUTPATIENT
Start: 2022-10-05 | End: 2022-10-06 | Stop reason: HOSPADM

## 2022-10-05 RX ORDER — CARBOPROST TROMETHAMINE 250 UG/ML
250 INJECTION, SOLUTION INTRAMUSCULAR
Status: DISCONTINUED | OUTPATIENT
Start: 2022-10-05 | End: 2022-10-06 | Stop reason: HOSPADM

## 2022-10-05 RX ORDER — BUPIVACAINE HYDROCHLORIDE 2.5 MG/ML
INJECTION, SOLUTION EPIDURAL; INFILTRATION; INTRACAUDAL PRN
Status: DISCONTINUED | OUTPATIENT
Start: 2022-10-05 | End: 2022-10-05

## 2022-10-05 RX ORDER — DOCUSATE SODIUM 100 MG/1
100 CAPSULE, LIQUID FILLED ORAL DAILY
Status: DISCONTINUED | OUTPATIENT
Start: 2022-10-05 | End: 2022-10-06 | Stop reason: HOSPADM

## 2022-10-05 RX ORDER — BISACODYL 10 MG
10 SUPPOSITORY, RECTAL RECTAL DAILY PRN
Status: DISCONTINUED | OUTPATIENT
Start: 2022-10-05 | End: 2022-10-06 | Stop reason: HOSPADM

## 2022-10-05 RX ADMIN — BUPIVACAINE HYDROCHLORIDE 5 ML: 2.5 INJECTION, SOLUTION EPIDURAL; INFILTRATION; INTRACAUDAL at 01:30

## 2022-10-05 RX ADMIN — DOCUSATE SODIUM 100 MG: 100 CAPSULE, LIQUID FILLED ORAL at 09:39

## 2022-10-05 RX ADMIN — BENZOCAINE AND LEVOMENTHOL: 200; 5 SPRAY TOPICAL at 20:12

## 2022-10-05 RX ADMIN — IBUPROFEN 800 MG: 800 TABLET ORAL at 09:42

## 2022-10-05 RX ADMIN — LIDOCAINE HYDROCHLORIDE,EPINEPHRINE BITARTRATE 3 ML: 15; .005 INJECTION, SOLUTION EPIDURAL; INFILTRATION; INTRACAUDAL; PERINEURAL at 01:24

## 2022-10-05 RX ADMIN — Medication: at 02:15

## 2022-10-05 RX ADMIN — OXYTOCIN 10 UNITS: 10 INJECTION, SOLUTION INTRAMUSCULAR; INTRAVENOUS at 03:24

## 2022-10-05 RX ADMIN — IBUPROFEN 800 MG: 800 TABLET ORAL at 20:11

## 2022-10-05 ASSESSMENT — ACTIVITIES OF DAILY LIVING (ADL)
ADLS_ACUITY_SCORE: 21

## 2022-10-05 NOTE — H&P
[facility] Labor and Delivery History and Physical    Makeda Perales MRN# [id number]   Age: 32 year old YOB: 1990     Date of Admission:  10/4/2022    Primary care provider: Dio Brown           Chief Complaint:   Makeda Perales is a 32 year old female  who is 37w4d pregnant and being admitted for SROM.  She had a large gush of fluid this evening following by continuous leaking. She reports occasional tightening but no pain.        Pregnancy history:     OBSTETRIC HISTORY:    OB History    Para Term  AB Living   2 1 1 0 0 1   SAB IAB Ectopic Multiple Live Births   0 0 0 0 1      # Outcome Date GA Lbr Wayne/2nd Weight Sex Delivery Anes PTL Lv   2 Current            1 Term 17 39w0d  2.92 kg (6 lb 7 oz) F Vag-Spont   MACARIO       EDC: Estimated Date of Delivery: 10/21/22    Prenatal Labs:   Lab Results   Component Value Date    HGB 13.8 2018       GBS Status: negative    Active Problem List  Patient Active Problem List   Diagnosis     Encounter for triage in pregnant patient     Pregnant       Medication Prior to Admission  Medications Prior to Admission   Medication Sig Dispense Refill Last Dose     Prenatal Vit-Fe Fumarate-FA (PRENATAL MULTIVITAMIN W/IRON) 27-0.8 MG tablet Take 1 tablet by mouth daily   10/4/2022 at Unknown time   .        Maternal Past Medical History:     Past Medical History:   Diagnosis Date     Prolactinoma (H)                     Review of Systems:   The Review of Systems is negative other than noted in the HPI          Physical Exam:   Vitals were reviewed  Temp: 98  F (36.7  C) Temp src: Oral BP: 122/74 Pulse: 83   Resp: 18        Gen: NAD  SVE: 2/60-2 per RN  FHT: Category 1  TOCO: q4-5                       Assessment:   Makeda Perales is a 37w4d pregnant female admitted with SROM.           Plan:   Admit - see IP orders  Expectant management. If no spontaneous labor, will start pitocin. Anticipate .     Kia Mendoza MD

## 2022-10-05 NOTE — ANESTHESIA POSTPROCEDURE EVALUATION
Patient: Makeda Perales    Procedure: * No procedures listed *       Anesthesia Type:  Epidural    Note:     Postop Pain Control: Uneventful            Sign Out: Well controlled pain   PONV: No   Neuro/Psych: Uneventful            Sign Out: Acceptable/Baseline neuro status   Airway/Respiratory: Uneventful            Sign Out: Acceptable/Baseline resp. status   CV/Hemodynamics: Uneventful            Sign Out: Acceptable CV status   Other NRE: NONE   DID A NON-ROUTINE EVENT OCCUR? No           Last vitals:  Vitals:    10/05/22 0149 10/05/22 0245 10/05/22 0400   BP: 95/52 111/71 111/66   Pulse:      Resp:  18    Temp:  36.8  C (98.2  F) 36.3  C (97.4  F)       Electronically Signed By: Charles Alfaro MD  October 5, 2022  5:38 AM

## 2022-10-05 NOTE — L&D DELIVERY NOTE
Vaginal Delivery Note    Name: Makeda Perales  : 1990  MRN: [id number]    PRE DELIVERY DIAGNOSIS  1) 32 year old  2 Para 1001 at 37w5d     2) GBS negative    POST DELIVERY DIAGNOSIS  1) 32 year old  @ 37w5d  2) Delivery of a viable female, apgars 9/9, weight pending    Procedure:     Augmentation: no               Indication:  Induction: no                       Indication:       Monitors: external     ROM: SROM  Fluid Type: clear    Labor Analgesia/Anesthesia: epidural    Cord pH obtained: no  Placenta submitted to Pathology: no    Description of procedure:   32 year old  with PNC w/ myself and pregnancy complicated by nothing presented to L&D with SROM .  Her hospital course was uncomplicated.  Patient progressed to complete spontaneously.      Infant spontaneously delivered over a 2nd degree laceration.  Infant delivered in VANESSA position.    Nuchal cord: yes    delivered through    Shoulder Dystocia: No  Operative Vaginal Delivery: No    Placenta spontaneously delivered grossly intact with 3 vessel cord.    Laceration was repaired in the normal fashion using epidural anesthesia  using 3-0 rapide.      Infant:  Live, vigourous infant was handed to mom.    Delivery was complicated by nothing.  Interventions included IM pitocin and fundal massage.  Total estimated blood loss (mL): 175     Mother and Infant stable.    Kia Mendoza MD    10/5/2022 3:44 AM

## 2022-10-05 NOTE — PLAN OF CARE
Pt. Came in last night due to her water breaking at home around 2100, progressed into active labor on her own, received an epidural around 0130 and delivered a baby girl at 0318. Has breast fed infant and is planning to bottle feed as well. , had second degree tear. Pt has been vitally stable for this shift. No concerns noted at this time.

## 2022-10-05 NOTE — ANESTHESIA PROCEDURE NOTES
Epidural catheter Procedure Note    Pre-Procedure   Staff -        Anesthesiologist:  Charles Alfaro MD       Performed By: anesthesiologist       Location: OB       Procedure Start/Stop Times: 10/5/2022 1:20 AM and 10/5/2022 1:36 AM       Pre-Anesthestic Checklist: patient identified, IV checked, risks and benefits discussed, informed consent, monitors and equipment checked, pre-op evaluation, at physician/surgeon's request and post-op pain management  Timeout:       Correct Patient: Yes        Correct Procedure: Yes        Correct Site: Yes        Correct Position: Yes   Procedure Documentation  Procedure: epidural catheter       Patient Position: sitting       Skin prep: Chloraprep       Local skin infiltrated with 5 mL of 1% lidocaine.        Insertion Site: L4-5. (midline approach).       Needle Type: Touhy needle       Needle Gauge: 18.        Needle Length (Inches): 3.5        Catheter: 19 G.          Catheter threaded easily.         4 cm epidural space.           # of attempts: 1 and  # of redirects:  0    Assessment/Narrative         Paresthesias: No.       Test dose of 3 mL at.         Test dose negative, 3 minutes after injection, for signs of intravascular, subdural, or intrathecal injection.       Aspiration negative for Heme or CSF via Epidural Catheter.    Medication(s) Administered   Medication Administration Time: 10/5/2022 1:20 AM

## 2022-10-05 NOTE — PLAN OF CARE
Problem: Adjustment to Role Transition (Postpartum Vaginal Delivery)  Goal: Successful Maternal Role Transition  Outcome: Ongoing, Progressing     Problem: Pain (Postpartum Vaginal Delivery)  Goal: Acceptable Pain Control  Outcome: Ongoing, Progressing  Intervention: Prevent or Manage Pain  Recent Flowsheet Documentation  Taken 10/5/2022 1528 by Christophe Camejo, RN  Pain Management Interventions: declines  Taken 10/5/2022 1309 by Christophe Camejo, RN  Pain Management Interventions: declines  VSS, patient ambulating in the room, spouse at the bedside. Patient breast feeding, reporting minimal milk production, supplementing feeds with formula. Denies heavy bleeding, pads minimally soaked.

## 2022-10-05 NOTE — ANESTHESIA PREPROCEDURE EVALUATION
Anesthesia Pre-Procedure Evaluation    Patient: Makeda Perales   MRN: [id number] : 1990        Procedure : * No procedures listed *          Past Medical History:   Diagnosis Date     Prolactinoma (H)       History reviewed. No pertinent surgical history.   No Known Allergies   Social History     Tobacco Use     Smoking status: Never Smoker     Smokeless tobacco: Never Used   Substance Use Topics     Alcohol use: Not on file      Wt Readings from Last 1 Encounters:   10/04/22 68 kg (149 lb 14.6 oz)        Anesthesia Evaluation            ROS/MED HX  ENT/Pulmonary:  - neg pulmonary ROS     Neurologic:  - neg neurologic ROS     Cardiovascular:  - neg cardiovascular ROS     METS/Exercise Tolerance:     Hematologic:  - neg hematologic  ROS     Musculoskeletal:  - neg musculoskeletal ROS     GI/Hepatic:  - neg GI/hepatic ROS     Renal/Genitourinary:  - neg Renal ROS     Endo:  - neg endo ROS     Psychiatric/Substance Use:  - neg psychiatric ROS     Infectious Disease:  - neg infectious disease ROS     Malignancy:  - neg malignancy ROS     Other:  - neg other ROS    (+) Possibly pregnant, ,         Physical Exam    Airway  airway exam normal      Mallampati: II       Respiratory Devices and Support         Dental  no notable dental history         Cardiovascular   cardiovascular exam normal          Pulmonary   pulmonary exam normal                OUTSIDE LABS:  CBC:   Lab Results   Component Value Date    WBC 3.5 (L) 2018    HGB 13.8 2018    HCT 40.5 2018     2018     BMP:   Lab Results   Component Value Date     2018    POTASSIUM 3.6 2018    CHLORIDE 104 2018    CO2 27 2018    BUN 16 2018    CR 0.59 2018    GLC 82 2018     COAGS: No results found for: PTT, INR, FIBR  POC:   Lab Results   Component Value Date    HCG Positive (A) 03/15/2022     HEPATIC:   Lab Results   Component Value Date    ALBUMIN 4.5 2018    PROTTOTAL 8.6 2018     ALT 23 11/29/2018    AST 14 11/29/2018    ALKPHOS 72 11/29/2018    BILITOTAL 0.8 11/29/2018     OTHER:   Lab Results   Component Value Date    BECKY 9.3 11/29/2018    TSH 1.93 11/29/2018    T4 0.98 11/29/2018       Anesthesia Plan    ASA Status:  2      Anesthesia Type: Epidural.              Consents    Anesthesia Plan(s) and associated risks, benefits, and realistic alternatives discussed. Questions answered and patient/representative(s) expressed understanding.    - Discussed:     - Discussed with:  Patient         Postoperative Care            Comments:                Charles Alfaro MD

## 2022-10-06 VITALS
TEMPERATURE: 98.4 F | BODY MASS INDEX: 25.59 KG/M2 | WEIGHT: 149.91 LBS | HEART RATE: 74 BPM | OXYGEN SATURATION: 97 % | HEIGHT: 64 IN | SYSTOLIC BLOOD PRESSURE: 109 MMHG | DIASTOLIC BLOOD PRESSURE: 65 MMHG | RESPIRATION RATE: 18 BRPM

## 2022-10-06 PROCEDURE — 250N000013 HC RX MED GY IP 250 OP 250 PS 637: Performed by: OBSTETRICS & GYNECOLOGY

## 2022-10-06 RX ORDER — ACETAMINOPHEN 325 MG/1
650 TABLET ORAL EVERY 4 HOURS PRN
Refills: 0 | COMMUNITY
Start: 2022-10-06

## 2022-10-06 RX ORDER — DOCUSATE SODIUM 100 MG/1
100 CAPSULE, LIQUID FILLED ORAL 2 TIMES DAILY PRN
COMMUNITY
Start: 2022-10-06

## 2022-10-06 RX ORDER — IBUPROFEN 800 MG/1
800 TABLET, FILM COATED ORAL EVERY 6 HOURS PRN
Qty: 30 TABLET | Refills: 0 | Status: SHIPPED | OUTPATIENT
Start: 2022-10-06

## 2022-10-06 RX ADMIN — Medication: at 06:12

## 2022-10-06 ASSESSMENT — ACTIVITIES OF DAILY LIVING (ADL)
ADLS_ACUITY_SCORE: 21

## 2022-10-06 NOTE — DISCHARGE SUMMARY
[facility] Discharge Summary    Makeda Perales MRN# [id number]   Age: 32 year old YOB: 1990     Date of Admission:  10/4/2022  Date of Discharge::  10/6/2022 10:50 AM  Admitting Physician:  Kia Mendoza MD  Discharge Physician:  Kia Mendoza MD     Home clinic: [facility]          Admission Diagnoses:   SROM          Discharge Diagnosis:     S/p                Medications Prior to Admission:     No medications prior to admission.             Discharge Medications:     Discharge Medication List as of 10/6/2022 10:08 AM      START taking these medications    Details   acetaminophen (TYLENOL) 325 MG tablet Take 2 tablets (650 mg) by mouth every 4 hours as needed for mild pain or fever (greater than or equal to 38  C /100.4  F (oral) or 38.5  C/ 101.4  F (core).), R-0, OTC      docusate sodium (COLACE) 100 MG capsule Take 1 capsule (100 mg) by mouth 2 times daily as needed for constipation, OTC      ibuprofen (ADVIL/MOTRIN) 800 MG tablet Take 1 tablet (800 mg) by mouth every 6 hours as needed for other (cramping), Disp-30 tablet, R-0, E-Prescribe         CONTINUE these medications which have NOT CHANGED    Details   Prenatal Vit-Fe Fumarate-FA (PRENATAL MULTIVITAMIN W/IRON) 27-0.8 MG tablet Take 1 tablet by mouth daily, Historical                   Hospital Course:   See admission H&P and delivery summary for details. The patient's hospital course was unremarkable.  On discharge, her pain was well controlled. Vaginal bleeding/lochia is appropriate for postpartum state.  She is voiding without difficulty, ambulating well and tolerating a normal diet.  Vital signs are stable on the date of discharge, baby is doing well.     Last hemoglobin:   Hemoglobin   Date Value Ref Range Status   2018 13.8 11.7 - 15.7 g/dL Final             Discharge Instructions and Follow-Up:     Discharge diet: Regular   Discharge activity: Pelvic rest: abstain from  intercourse and do not use tampons for 6 week(s)   Discharge follow-up: Follow-up in office for routine postpartum care at 6 weeks, sooner with any concerns or issues   Wound care: Drink plenty of fluids           Discharge Disposition:     Discharged to home      Attestation:  I have reviewed today's vital signs, notes, medications, labs.    Kia Mendoza MD

## 2022-10-06 NOTE — DISCHARGE INSTRUCTIONS
Postpartum Vaginal Delivery Instructions    Activity     Ask family and friends for help when you need it.  Do not place anything in your vagina for 6 weeks.  You are not restricted on other activities, but take it easy for a few weeks to allow your body to recover from delivery.  You are able to do any activities you feel up to that point.  No driving until you have stopped taking your pain medications (usually two weeks after delivery).     Call your health care provider if you have any of these symptoms:     Increased pain, swelling, redness, or fluid around your stiches from an episiotomy or perineal tear.  A fever above 100.4 F (38 C) with or without chills when placing a thermometer under your tongue.  You soak a sanitary pad with blood within 1 hour, or you see blood clots larger than a golf ball.  Bleeding that lasts more than 6 weeks.  Vaginal discharge that smells bad.  Severe pain, cramping or tenderness in your lower belly area.  A need to urinate more frequently (use the toilet more often), more urgently (use the toilet very quickly), or it burns when you urinate.  Nausea and vomiting.  Redness, swelling or pain around a vein in your leg.  Problems breastfeeding or a red or painful area on your breast.  Chest pain and cough or are gasping for air.  Problems coping with sadness, anxiety, or depression.  If you have any concerns about hurting yourself or the baby, call your provider immediately.   You have questions or concerns after you return home.     Keep your hands clean:  Always wash your hands before touching your perineal area and stitches.  This helps reduce your risk of infection.  If your hands aren't dirty, you may use an alcohol hand-rub to clean your hands. Keep your nails clean and short.      A Homecare Visit is set up on Sat, Oct 8th. The RN will call you after 4 p.m. the evening before the visit with a time. Please do not make a clinic visit for the same day as your Homecare Visit. You  can contact [facility] at [phone] if you have any further questions related to the home visit.

## 2022-10-06 NOTE — PROGRESS NOTES
"Outreach Note for [facility]    Chart reviewed, discharge plan discussed with patient, needs assessed. Patient verbalizes understanding of plan, requests [facility] visit as ordered, [facility] nurse visit planned for Sat, Oct 8th, Home Care Intake updated. Patient and , \"Aniya Erickson\", phone number is reported as correct in EMR.    Patient states she has good support at home, has baby care essentials, and feels ready to discharge today. Outreach RN will continue to follow and assist if needed with discharge plan. No additional needs identified at this time.        "

## 2022-11-04 ENCOUNTER — MEDICAL CORRESPONDENCE (OUTPATIENT)
Dept: HEALTH INFORMATION MANAGEMENT | Facility: CLINIC | Age: 32
End: 2022-11-04

## 2022-12-07 ENCOUNTER — MEDICAL CORRESPONDENCE (OUTPATIENT)
Dept: HEALTH INFORMATION MANAGEMENT | Facility: CLINIC | Age: 32
End: 2022-12-07

## 2023-02-07 ENCOUNTER — MEDICAL CORRESPONDENCE (OUTPATIENT)
Dept: HEALTH INFORMATION MANAGEMENT | Facility: CLINIC | Age: 33
End: 2023-02-07

## 2023-05-09 ENCOUNTER — MYC MEDICAL ADVICE (OUTPATIENT)
Dept: ENDOCRINOLOGY | Facility: CLINIC | Age: 33
End: 2023-05-09
Payer: COMMERCIAL

## 2023-05-09 DIAGNOSIS — D35.2 PROLACTINOMA (H): Primary | ICD-10-CM

## 2023-05-11 ENCOUNTER — TELEPHONE (OUTPATIENT)
Dept: ENDOCRINOLOGY | Facility: CLINIC | Age: 33
End: 2023-05-11
Payer: COMMERCIAL

## 2023-05-11 RX ORDER — CABERGOLINE 0.5 MG/1
0.25 TABLET ORAL WEEKLY
Qty: 6 TABLET | Refills: 1 | Status: SHIPPED | OUTPATIENT
Start: 2023-05-11

## 2023-06-04 ENCOUNTER — HEALTH MAINTENANCE LETTER (OUTPATIENT)
Age: 33
End: 2023-06-04

## 2024-07-14 ENCOUNTER — HEALTH MAINTENANCE LETTER (OUTPATIENT)
Age: 34
End: 2024-07-14

## 2025-07-19 ENCOUNTER — HEALTH MAINTENANCE LETTER (OUTPATIENT)
Age: 35
End: 2025-07-19